# Patient Record
Sex: FEMALE | Race: WHITE | NOT HISPANIC OR LATINO | Employment: FULL TIME | URBAN - METROPOLITAN AREA
[De-identification: names, ages, dates, MRNs, and addresses within clinical notes are randomized per-mention and may not be internally consistent; named-entity substitution may affect disease eponyms.]

---

## 2017-02-17 ENCOUNTER — OFFICE VISIT (OUTPATIENT)
Dept: URGENT CARE | Facility: PHYSICIAN GROUP | Age: 52
End: 2017-02-17
Payer: COMMERCIAL

## 2017-02-17 VITALS
HEART RATE: 65 BPM | OXYGEN SATURATION: 96 % | BODY MASS INDEX: 40.25 KG/M2 | SYSTOLIC BLOOD PRESSURE: 122 MMHG | WEIGHT: 234.6 LBS | RESPIRATION RATE: 14 BRPM | DIASTOLIC BLOOD PRESSURE: 68 MMHG | TEMPERATURE: 97.3 F

## 2017-02-17 DIAGNOSIS — R06.2 WHEEZING: ICD-10-CM

## 2017-02-17 DIAGNOSIS — J06.9 UPPER RESPIRATORY TRACT INFECTION, UNSPECIFIED TYPE: ICD-10-CM

## 2017-02-17 PROCEDURE — 99214 OFFICE O/P EST MOD 30 MIN: CPT | Performed by: PHYSICIAN ASSISTANT

## 2017-02-17 RX ORDER — ALBUTEROL SULFATE 90 UG/1
2 AEROSOL, METERED RESPIRATORY (INHALATION) EVERY 6 HOURS PRN
Qty: 8.5 G | Refills: 0 | Status: SHIPPED | OUTPATIENT
Start: 2017-02-17 | End: 2019-02-20

## 2017-02-17 RX ORDER — CODEINE PHOSPHATE AND GUAIFENESIN 10; 100 MG/5ML; MG/5ML
5 SOLUTION ORAL EVERY 4 HOURS PRN
Qty: 180 ML | Refills: 0 | Status: SHIPPED | OUTPATIENT
Start: 2017-02-17 | End: 2017-02-23

## 2017-02-17 RX ORDER — BENZONATATE 100 MG/1
200 CAPSULE ORAL 3 TIMES DAILY PRN
Qty: 30 CAP | Refills: 0 | Status: SHIPPED | OUTPATIENT
Start: 2017-02-17 | End: 2019-02-20

## 2017-02-17 ASSESSMENT — ENCOUNTER SYMPTOMS
MYALGIAS: 0
HEADACHES: 0
RHINORRHEA: 1
SHORTNESS OF BREATH: 0
WHEEZING: 1
ABDOMINAL PAIN: 0
COUGH: 1
CHILLS: 0
FEVER: 0
SORE THROAT: 1
NECK PAIN: 0
DIARRHEA: 0
SPUTUM PRODUCTION: 0
EYE REDNESS: 0
EYE DISCHARGE: 0

## 2017-02-17 NOTE — PROGRESS NOTES
Subjective:      Zoya Urban is a 52 y.o. female who presents with Cough          Pt is 51 y/o female who presents with congestion and cough for 4-5 days ago. Pt. Notes that she probably got sick from her brother in law. She denies any SOB, CP, or leg swelling.   Cough  This is a new problem. Episode onset: 4 days ago. The problem has been gradually worsening. The problem occurs every few minutes. The cough is non-productive. Associated symptoms include ear congestion, nasal congestion, postnasal drip, rhinorrhea, a sore throat and wheezing. Pertinent negatives include no chest pain, chills, ear pain, eye redness, fever, headaches, myalgias, rash or shortness of breath. Nothing aggravates the symptoms. She has tried nothing for the symptoms. Her past medical history is significant for bronchitis.       Review of Systems   Constitutional: Negative for fever, chills and malaise/fatigue.   HENT: Positive for congestion, postnasal drip, rhinorrhea and sore throat. Negative for ear discharge and ear pain.    Eyes: Negative for discharge and redness.   Respiratory: Positive for cough and wheezing. Negative for sputum production and shortness of breath.    Cardiovascular: Negative for chest pain and leg swelling.   Gastrointestinal: Negative for abdominal pain and diarrhea.   Genitourinary: Negative for dysuria and urgency.   Musculoskeletal: Negative for myalgias and neck pain.   Skin: Negative for itching and rash.   Neurological: Negative for headaches.          Objective:     /68 mmHg  Pulse 65  Temp(Src) 36.3 °C (97.3 °F)  Resp 14  Wt 106.414 kg (234 lb 9.6 oz)  SpO2 96%   PMH:  has a past medical history of Allergy and Heart murmur. She also has no past medical history of Breast cancer or Cancer.  MEDS:   Current outpatient prescriptions:   •  albuterol 108 (90 BASE) MCG/ACT Aero Soln inhalation aerosol, Inhale 2 Puffs by mouth every 6 hours as needed for Shortness of Breath., Disp: 8.5 g, Rfl: 0  •   benzonatate (TESSALON) 100 MG Cap, Take 2 Caps by mouth 3 times a day as needed for Cough., Disp: 30 Cap, Rfl: 0  •  guaifenesin-codeine (ROBITUSSIN AC) Solution oral solution, Take 5 mL by mouth every four hours as needed for Cough (May cause sedation) for up to 6 days., Disp: 180 mL, Rfl: 0  ALLERGIES:   Allergies   Allergen Reactions   • Cipro Xr      SURGHX:   Past Surgical History   Procedure Laterality Date   • Abdominal exploration       SOCHX:  reports that she has never smoked. She does not have any smokeless tobacco history on file. She reports that she drinks about 1.0 oz of alcohol per week. She reports that she does not use illicit drugs.  FH: Family history was reviewed, no pertinent findings to report    Physical Exam   Constitutional: She is oriented to person, place, and time. She appears well-developed and well-nourished.   HENT:   Head: Normocephalic and atraumatic.   Right Ear: External ear normal.   Left Ear: External ear normal.   Mouth/Throat: Oropharynx is clear and moist. No oropharyngeal exudate.   Boggy nasal turbinates       Eyes: Conjunctivae and EOM are normal. Pupils are equal, round, and reactive to light.   Neck: Normal range of motion. Neck supple.   Cardiovascular: Normal rate and regular rhythm.    Murmur heard.  Pulmonary/Chest: Effort normal and breath sounds normal. No respiratory distress.   Musculoskeletal: Normal range of motion. She exhibits no tenderness.   Lymphadenopathy:     She has no cervical adenopathy.   Neurological: She is alert and oriented to person, place, and time.   Skin: Skin is warm. No rash noted.   Psychiatric: She has a normal mood and affect. Her behavior is normal.   Vitals reviewed.              Assessment/Plan:     1. Upper respiratory tract infection, unspecified type  - albuterol 108 (90 BASE) MCG/ACT Aero Soln inhalation aerosol; Inhale 2 Puffs by mouth every 6 hours as needed for Shortness of Breath.  Dispense: 8.5 g; Refill: 0  - benzonatate  (TESSALON) 100 MG Cap; Take 2 Caps by mouth 3 times a day as needed for Cough.  Dispense: 30 Cap; Refill: 0  - guaifenesin-codeine (ROBITUSSIN AC) Solution oral solution; Take 5 mL by mouth every four hours as needed for Cough (May cause sedation) for up to 6 days.  Dispense: 180 mL; Refill: 0    [place]  was reviewed by myself-  Document  does not reveal any concerning patterns. Pt. was advised to avoid the operation of heavy machine along with driving while on such medications. Finally pt. was advised to use medication only as prescribed.       2. Wheezing- without wheezing on exam- she is to fill albuterol if she develops chest tightness or wheezing the future.     - albuterol 108 (90 BASE) MCG/ACT Aero Soln inhalation aerosol; Inhale 2 Puffs by mouth every 6 hours as needed for Shortness of Breath.  Dispense: 8.5 g; Refill: 0    Discussed viral nature of symptoms today. Increase fluids. Avoid night time dairy  Patient given precautionary s/sx that mandate immediate follow up and evaluation in the ED. Advised of risks of not doing so.    DDX, Supportive care, and indications for immediate follow-up discussed with patient.    Instructed to return to clinic or nearest emergency department if we are not available for any change in condition, further concerns, or worsening of symptoms.    The patient demonstrated a good understanding and agreed with the treatment plan.

## 2017-02-17 NOTE — MR AVS SNAPSHOT
Zoya Urban   2017 11:15 AM   Office Visit   MRN: [id number]    Department:  [facility]   Dept Phone:  [phone]    Description:  Female : 1965   Provider:  Brady Mckeon PA-C           Reason for Visit     Cough x 4 days       Allergies as of 2017     Allergen Noted Reactions    Cipro Xr 2010         You were diagnosed with     Upper respiratory tract infection, unspecified type   [8797004]       Wheezing   [786.07.ICD-9-CM]         Vital Signs     Blood Pressure Pulse Temperature Respirations Weight Oxygen Saturation    122/68 mmHg 65 36.3 °C (97.3 °F) 14 106.414 kg (234 lb 9.6 oz) 96%      Basic Information     Date Of Birth Sex Race Ethnicity Preferred Language    1965 Female White Non- English      Health Maintenance        Date Due Completion Dates    IMM DTaP/Tdap/Td Vaccine (1 - Tdap) 2/3/1982 ---    PAP SMEAR 2/3/1984 ---    COLONOSCOPY 2/3/2013 ---    IMM INFLUENZA (1) 2016 ---    MAMMOGRAM 3/24/2017 3/24/2016, 2014, 2013, 2011, 2010, 2010, 2009, 2009, 2008, 2008, 2007, 2007, 10/12/2006, 10/12/2006, 3/30/2006, 3/16/2006, 3/7/2005, 3/4/2004            Current Immunizations     No immunizations on file.      Below and/or attached are the medications your provider expects you to take. Review all of your home medications and newly ordered medications with your provider and/or pharmacist. Follow medication instructions as directed by your provider and/or pharmacist. Please keep your medication list with you and share with your provider. Update the information when medications are discontinued, doses are changed, or new medications (including over-the-counter products) are added; and carry medication information at all times in the event of emergency situations     Allergies:  CIPRO XR - (reactions not documented)               Medications  Valid as of: 2017 - 11:47 AM    Generic Name  Brand Name Tablet Size Instructions for use    Albuterol Sulfate (Aero Soln) albuterol 108 (90 BASE) MCG/ACT Inhale 2 Puffs by mouth every 6 hours as needed for Shortness of Breath.        Benzonatate (Cap) TESSALON 100 MG Take 2 Caps by mouth 3 times a day as needed for Cough.        Guaifenesin-Codeine (Solution) ROBITUSSIN -10 mg/5mL Take 5 mL by mouth every four hours as needed for Cough (May cause sedation) for up to 6 days.        .                 Medicines prescribed today were sent to:     [id number] - [place], [place] - [address]    [address] [place] [address]    Phone: [phone] Fax: [phone]    Open 24 Hours?: No    [id number] - [place], [place] - [address] [place] [address]    Phone: [phone] Fax: [phone]    Open 24 Hours?: No      Medication refill instructions:       If your prescription bottle indicates you have medication refills left, it is not necessary to call your provider’s office. Please contact your pharmacy and they will refill your medication.    If your prescription bottle indicates you do not have any refills left, you may request refills at any time through one of the following ways: The online [employer] system (except Urgent Care), by calling your provider’s office, or by asking your pharmacy to contact your provider’s office with a refill request. Medication refills are processed only during regular business hours and may not be available until the next business day. Your provider may request additional information or to have a follow-up visit with you prior to refilling your medication.   *Please Note: Medication refills are assigned a new Rx number when refilled electronically. Your pharmacy may indicate that no refills were authorized even though a new prescription for the same medication is available at the pharmacy. Please request the medicine by name with the pharmacy before  contacting your provider for a refill.           Mobile-XL Access Code: [email or web address]  Expires: 3/19/2017 11:47 AM    Mobile-XL  A secure, online tool to manage your health information     [employer] Mobile-XL® is a secure, online tool that connects you to your personalized health information from the privacy of your home -- day or night - making it very easy for you to manage your healthcare. Once the activation process is completed, you can even access your medical information using the Mobile-XL ricky, which is available for free in the Apple Ricky store or Google Play store.     Mobile-XL provides the following levels of access (as shown below):   My Chart Features   [facility] Primary Care Doctor [facility]  Specialists [facility]  Urgent  Care Non-[facility]  Primary Care  Doctor   Email your healthcare team securely and privately 24/7 X X X    Manage appointments: schedule your next appointment; view details of past/upcoming appointments X      Request prescription refills. X      View recent personal medical records, including lab and immunizations X X X X   View health record, including health history, allergies, medications X X X X   Read reports about your outpatient visits, procedures, consult and ER notes X X X X   See your discharge summary, which is a recap of your hospital and/or ER visit that includes your diagnosis, lab results, and care plan. X X       How to register for Mobile-XL:  1. Go to  [email or web address].  2. Click on the Sign Up Now box, which takes you to the New Member Sign Up page. You will need to provide the following information:  a. Enter your Mobile-XL Access Code exactly as it appears at the top of this page. (You will not need to use this code after you’ve completed the sign-up process. If you do not sign up before the expiration date, you must request a new code.)   b. Enter your date of birth.   c. Enter your home email address.   d. Click Submit, and follow the next screen’s  instructions.  3. Create a Livekickt ID. This will be your Livekickt login ID and cannot be changed, so think of one that is secure and easy to remember.  4. Create a Livekickt password. You can change your password at any time.  5. Enter your Password Reset Question and Answer. This can be used at a later time if you forget your password.   6. Enter your e-mail address. This allows you to receive e-mail notifications when new information is available in [employer].  7. Click Sign Up. You can now view your health information.    For assistance activating your [employer] account, call [phone]

## 2017-08-14 ENCOUNTER — HOSPITAL ENCOUNTER (OUTPATIENT)
Dept: RADIOLOGY | Facility: MEDICAL CENTER | Age: 52
End: 2017-08-14
Attending: OBSTETRICS & GYNECOLOGY
Payer: COMMERCIAL

## 2017-08-14 DIAGNOSIS — Z12.31 VISIT FOR SCREENING MAMMOGRAM: ICD-10-CM

## 2017-08-14 PROCEDURE — 77063 BREAST TOMOSYNTHESIS BI: CPT

## 2017-08-19 ENCOUNTER — HOSPITAL ENCOUNTER (OUTPATIENT)
Dept: LAB | Facility: MEDICAL CENTER | Age: 52
End: 2017-08-19
Attending: OBSTETRICS & GYNECOLOGY
Payer: COMMERCIAL

## 2017-08-19 LAB
ALBUMIN SERPL BCP-MCNC: 3.4 G/DL (ref 3.2–4.9)
ALBUMIN/GLOB SERPL: 1 G/DL
ALP SERPL-CCNC: 73 U/L (ref 30–99)
ALT SERPL-CCNC: 18 U/L (ref 2–50)
ANION GAP SERPL CALC-SCNC: 6 MMOL/L (ref 0–11.9)
APTT PPP: 27.8 SEC (ref 24.7–36)
AST SERPL-CCNC: 20 U/L (ref 12–45)
BASOPHILS # BLD AUTO: 1.3 % (ref 0–1.8)
BASOPHILS # BLD: 0.07 K/UL (ref 0–0.12)
BILIRUB SERPL-MCNC: 0.4 MG/DL (ref 0.1–1.5)
BUN SERPL-MCNC: 18 MG/DL (ref 8–22)
CALCIUM SERPL-MCNC: 9 MG/DL (ref 8.5–10.5)
CHLORIDE SERPL-SCNC: 108 MMOL/L (ref 96–112)
CHOLEST SERPL-MCNC: 189 MG/DL (ref 100–199)
CO2 SERPL-SCNC: 27 MMOL/L (ref 20–33)
CREAT SERPL-MCNC: 0.68 MG/DL (ref 0.5–1.4)
EOSINOPHIL # BLD AUTO: 0.09 K/UL (ref 0–0.51)
EOSINOPHIL NFR BLD: 1.6 % (ref 0–6.9)
ERYTHROCYTE [DISTWIDTH] IN BLOOD BY AUTOMATED COUNT: 50.3 FL (ref 35.9–50)
GFR SERPL CREATININE-BSD FRML MDRD: >60 ML/MIN/1.73 M 2
GLOBULIN SER CALC-MCNC: 3.4 G/DL (ref 1.9–3.5)
GLUCOSE SERPL-MCNC: 79 MG/DL (ref 65–99)
HCT VFR BLD AUTO: 40.4 % (ref 37–47)
HDLC SERPL-MCNC: 49 MG/DL
HGB BLD-MCNC: 12.3 G/DL (ref 12–16)
IMM GRANULOCYTES # BLD AUTO: 0.01 K/UL (ref 0–0.11)
IMM GRANULOCYTES NFR BLD AUTO: 0.2 % (ref 0–0.9)
INR PPP: 0.94 (ref 0.87–1.13)
LDLC SERPL CALC-MCNC: 123 MG/DL
LYMPHOCYTES # BLD AUTO: 1.82 K/UL (ref 1–4.8)
LYMPHOCYTES NFR BLD: 32.7 % (ref 22–41)
MCH RBC QN AUTO: 27.4 PG (ref 27–33)
MCHC RBC AUTO-ENTMCNC: 30.4 G/DL (ref 33.6–35)
MCV RBC AUTO: 90 FL (ref 81.4–97.8)
MONOCYTES # BLD AUTO: 0.51 K/UL (ref 0–0.85)
MONOCYTES NFR BLD AUTO: 9.2 % (ref 0–13.4)
NEUTROPHILS # BLD AUTO: 3.07 K/UL (ref 2–7.15)
NEUTROPHILS NFR BLD: 55 % (ref 44–72)
NRBC # BLD AUTO: 0 K/UL
NRBC BLD AUTO-RTO: 0 /100 WBC
PLATELET # BLD AUTO: 357 K/UL (ref 164–446)
PMV BLD AUTO: 9.9 FL (ref 9–12.9)
POTASSIUM SERPL-SCNC: 4.2 MMOL/L (ref 3.6–5.5)
PROT SERPL-MCNC: 6.8 G/DL (ref 6–8.2)
PROTHROMBIN TIME: 12.9 SEC (ref 12–14.6)
RBC # BLD AUTO: 4.49 M/UL (ref 4.2–5.4)
SODIUM SERPL-SCNC: 141 MMOL/L (ref 135–145)
T4 SERPL-MCNC: 7.1 UG/DL (ref 4–12)
TRIGL SERPL-MCNC: 84 MG/DL (ref 0–149)
TSH SERPL DL<=0.005 MIU/L-ACNC: 1.96 UIU/ML (ref 0.3–3.7)
WBC # BLD AUTO: 5.6 K/UL (ref 4.8–10.8)

## 2017-08-19 PROCEDURE — 84436 ASSAY OF TOTAL THYROXINE: CPT

## 2017-08-19 PROCEDURE — 85730 THROMBOPLASTIN TIME PARTIAL: CPT

## 2017-08-19 PROCEDURE — 84443 ASSAY THYROID STIM HORMONE: CPT

## 2017-08-19 PROCEDURE — 85610 PROTHROMBIN TIME: CPT

## 2017-08-19 PROCEDURE — 36415 COLL VENOUS BLD VENIPUNCTURE: CPT

## 2017-08-19 PROCEDURE — 80053 COMPREHEN METABOLIC PANEL: CPT

## 2017-08-19 PROCEDURE — 80061 LIPID PANEL: CPT

## 2017-08-19 PROCEDURE — 85025 COMPLETE CBC W/AUTO DIFF WBC: CPT

## 2019-02-20 ENCOUNTER — APPOINTMENT (OUTPATIENT)
Dept: RADIOLOGY | Facility: MEDICAL CENTER | Age: 54
End: 2019-02-20
Attending: OBSTETRICS & GYNECOLOGY
Payer: COMMERCIAL

## 2019-02-20 ENCOUNTER — OFFICE VISIT (OUTPATIENT)
Dept: MEDICAL GROUP | Facility: MEDICAL CENTER | Age: 54
End: 2019-02-20
Payer: COMMERCIAL

## 2019-02-20 VITALS
HEART RATE: 74 BPM | HEIGHT: 64 IN | DIASTOLIC BLOOD PRESSURE: 76 MMHG | BODY MASS INDEX: 42.34 KG/M2 | OXYGEN SATURATION: 97 % | SYSTOLIC BLOOD PRESSURE: 128 MMHG | WEIGHT: 248 LBS | RESPIRATION RATE: 16 BRPM

## 2019-02-20 DIAGNOSIS — M79.622 PAIN OF LEFT UPPER ARM: ICD-10-CM

## 2019-02-20 DIAGNOSIS — Z00.00 ROUTINE GENERAL MEDICAL EXAMINATION AT A HEALTH CARE FACILITY: ICD-10-CM

## 2019-02-20 DIAGNOSIS — R01.1 HEART MURMUR: ICD-10-CM

## 2019-02-20 PROCEDURE — 99214 OFFICE O/P EST MOD 30 MIN: CPT | Performed by: NURSE PRACTITIONER

## 2019-02-20 ASSESSMENT — ENCOUNTER SYMPTOMS: MYALGIAS: 1

## 2019-02-20 ASSESSMENT — PATIENT HEALTH QUESTIONNAIRE - PHQ9: CLINICAL INTERPRETATION OF PHQ2 SCORE: 0

## 2019-02-20 NOTE — PROGRESS NOTES
Subjective:      Zoya Urban is a 54 y.o. female who presents with [facility]        CC: Patient here today to establish with a PCP as well as for left arm pain.  She has been following with her gynecologist about every 2 years but has not seen a regular PCP in over a decade.    HPI Zoya Urban      1. Heart murmur  Patient noted to have heart murmur when being examined today and she states she was diagnosed with this at the age of 4.  She states she also had a heart catheterization at the age of 16 and nothing abnormal was found.  She states she has not had an echocardiogram in at least 10 years.  She reports however that she has no problems with fatigue, shortness of breath or chest pain.    2. Pain of left upper arm  Patient states that for the past few months she has had a pain in her left bicep area.  The pain usually occurs with any movement of the left arm.  She states there is also stiffness and she has difficulty elevating the left arm without pain in the bicep.  The pain does not radiate from the neck and she does not report any pain or numbness of her hands.  She denies any trauma.    3. Routine general medical examination at a [facility] care facility  Patient's last blood work was done in 2017 for her gynecologist.  Review of those results show a normal chemistry panel and CBC.  TSH was also normal but lipid panel showed an LDL of 123 with normal total cholesterol and HDL.    4. BMI 40.0-44.9, adult (HCC)  Patient states she has had problems with weight for many years.  She states she did have a prior gastric bypass.  No current outpatient prescriptions on file.     No current facility-administered medications for this visit.      Social History   Substance Use Topics   • Smoking status: Never Smoker   • Smokeless tobacco: Never Used   • Alcohol use 2.4 oz/week     4 Glasses of wine per week     Family History   Problem Relation Age of Onset   • Cancer Maternal Aunt         breast   • Cancer  "Mother    • Cancer Father         prosate   • Lung Disease Father      Past Medical History:   Diagnosis Date   • Allergy    • Heart murmur        Review of Systems   Musculoskeletal: Positive for myalgias.   All other systems reviewed and are negative.         Objective:     /76 (BP Location: Right arm, Patient Position: Sitting, BP Cuff Size: Adult)   Pulse 74   Resp 16   Ht 1.626 m (5' 4\")   Wt 112.5 kg (248 lb)   SpO2 97%   BMI 42.57 kg/m²      Physical Exam   Constitutional: She is oriented to person, place, and time. She appears well-developed and well-nourished. No distress.   HENT:   Head: Normocephalic and atraumatic.   Right Ear: External ear normal.   Left Ear: External ear normal.   Nose: Nose normal.   Eyes: Right eye exhibits no discharge. Left eye exhibits no discharge.   Neck: Normal range of motion. Neck supple. No thyromegaly present.   Cardiovascular: Normal rate and regular rhythm.  Exam reveals no gallop and no friction rub.    Murmur heard.  Pulmonary/Chest: Effort normal and breath sounds normal. She has no wheezes. She has no rales.   Musculoskeletal: She exhibits no edema or tenderness.   Decreased mobility of the left arm with elevation only to 45 degrees and pain in the left bicep area.   Neurological: She is alert and oriented to person, place, and time. She displays normal reflexes.   Skin: Skin is warm and dry. No rash noted. She is not diaphoretic.   Psychiatric: She has a normal mood and affect. Her behavior is normal. Judgment and thought content normal.   Nursing note and vitals reviewed.              Assessment/Plan:     1. Heart murmur  Patient states she has had this murmur since she was child and is asymptomatic but has not had an echocardiogram in at least 10 years and we do not have those prior results.  I told her I would like to do a routine echo to make sure she is not showing moderate to severe valvular disease for which she would need to be referred to " cardiology.  - EC-ECHOCARDIOGRAM COMPLETE W/O CONT; Future    2. Pain of left upper arm  I will have patient first start with physical therapy on the left arm to increase mobility and decrease pain.  If there is no response I will send her to orthopedics.  - REFERRAL TO PHYSICAL THERAPY Reason for Therapy: Eval/Treat/Report    3. Routine general medical examination at a health care facility  I advised patient to do yearly blood work.  I explained based on her previous results, her LDL was elevated and we should do a 10-year ASCVD risk assessment after her blood work is completed.  - Comp Metabolic Panel; Future  - Lipid Profile; Future  - TSH; Future    4. BMI 40.0-44.9, adult (HCC)  Patient has had previous gastric bypass and does not wish to go any further with counseling or bariatrics.  - Patient identified as having weight management issue.  Appropriate orders and counseling given.

## 2019-03-06 ENCOUNTER — HOSPITAL ENCOUNTER (OUTPATIENT)
Dept: CARDIOLOGY | Facility: MEDICAL CENTER | Age: 54
End: 2019-03-06
Attending: NURSE PRACTITIONER
Payer: COMMERCIAL

## 2019-03-06 DIAGNOSIS — Q21.0 VENTRICULAR SEPTAL DEFECT: ICD-10-CM

## 2019-03-06 DIAGNOSIS — R01.1 HEART MURMUR: ICD-10-CM

## 2019-03-06 LAB
LV EJECT FRACT  99904: 65
LV EJECT FRACT MOD 2C 99903: 54.7
LV EJECT FRACT MOD 4C 99902: 64.92
LV EJECT FRACT MOD BP 99901: 60

## 2019-03-06 PROCEDURE — 93306 TTE W/DOPPLER COMPLETE: CPT

## 2019-03-06 PROCEDURE — 93306 TTE W/DOPPLER COMPLETE: CPT | Mod: 26 | Performed by: INTERNAL MEDICINE

## 2019-03-11 ENCOUNTER — APPOINTMENT (OUTPATIENT)
Dept: PHYSICAL THERAPY | Facility: REHABILITATION | Age: 54
End: 2019-03-11
Attending: NURSE PRACTITIONER
Payer: COMMERCIAL

## 2019-03-11 DIAGNOSIS — M25.512 CHRONIC LEFT SHOULDER PAIN: ICD-10-CM

## 2019-03-11 DIAGNOSIS — G89.29 CHRONIC LEFT SHOULDER PAIN: ICD-10-CM

## 2019-03-11 PROCEDURE — 97161 PT EVAL LOW COMPLEX 20 MIN: CPT

## 2019-03-11 PROCEDURE — 97014 ELECTRIC STIMULATION THERAPY: CPT

## 2019-03-11 ASSESSMENT — ENCOUNTER SYMPTOMS
QUALITY: DULL ACHE
PAIN SCALE AT LOWEST: 3
ALLEVIATING FACTORS: NOTHING
EXACERBATED BY: LIFTING
PAIN SCALE: 7
QUALITY: ACHING
EXACERBATED BY: ACTIVITY
PAIN SCALE AT HIGHEST: 9

## 2019-03-11 NOTE — OP THERAPY EVALUATION
Outpatient Physical Therapy  INITIAL EVALUATION    [facility] [address]  [address]  [place] [address]  Phone:  [phone]  Fax:  [phone]    Date of Evaluation: 2019    Patient: Zoya Urban  YOB: 1965  MRN: [id number]     Referring Provider: DENNY Collins  [address]  [place] [address]   Referring Diagnosis Pain of left upper arm [M79.622]     Time Calculation  Start time: 1615  Stop time: 1700 Time Calculation (min): 45 minutes     Physical Therapy Occurrence Codes    Date of onset of impairment:  18   Date physical therapy care plan established or reviewed:  3/11/19   Date physical therapy treatment started:  3/11/19          Chief Complaint: Shoulder Problem    Visit Diagnoses     ICD-10-CM   1. Chronic left shoulder pain M25.512    G89.29         Subjective:   History of Present Illness:     Date of onset:  2018    Mechanism of injury:  Patient reported increased pain with lifting arm, washing hair and getting dressed. She reports right upper arm pain that has progressively worsened. She is also reporting tingling along medial border of her shoulder pain.  She has increased pain with reclining on the couch.   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    Patient Goals:     Patient goals for therapy:  Decreased pain and increased motion      Past Medical History:   Diagnosis  Date   • Allergy    • Heart murmur      Past Surgical History:   Procedure Laterality Date   • ABDOMINAL EXPLORATION     • GASTRIC BYPASS LAPAROSCOPIC       Social History   Substance Use Topics   • Smoking status: Never Smoker   • Smokeless tobacco: Never Used   • Alcohol use 2.4 oz/week     4 Glasses of wine per week     Family and Occupational History     Social History   • Marital status:      Spouse name: N/A   • Number of children: N/A   • Years of education: N/A       Objective     Postural Observations  Seated posture: fair  Standing posture: fair    Additional Postural Observation Details  Forward head, thoracic kyphosis    Cervical Screen    Cervical range of motion within normal limits    Neurological Testing     Sensation     Shoulder   Left Shoulder   Intact: light touch    Right Shoulder   Intact: light touch    Reflexes   Left   Biceps (C5/C6): normal (2+)  Brachioradialis (C6): normal (2+)  Triceps (C7): normal (2+)    Right   Biceps (C5/C6): normal (2+)  Brachioradialis (C6): normal (2+)  Triceps (C7): normal (2+)    Active Range of Motion   Left Shoulder   Flexion: 90 degrees with pain  Abduction: 85 degrees with pain  Internal rotation BTB: L5 with pain    Right Shoulder   Normal active range of motion    Additional Active Range of Motion Details  RMIN strong and painless except internal rotation     Passive Range of Motion   Left Shoulder   Flexion: 105 degrees with pain  Abduction: 105 degrees with pain    Additional Passive Range of Motion Details  Patient limited with PROM testing by pain, but endfeel appears empty.    Joint Play   Left Shoulder     Anterior capsule: within functional limits    Posterior capsule: within functional limits    Inferior capsule: within functional limits    Tests     Left Shoulder   Negative full can and Spurling's sign.     Additional Tests Details  Any movement greater than 90 degrees is painful actively         Therapeutic Exercises (CPT 51910):     1.  [place]     2. Postural     4. R/O shoulder vs. cervical     Therapeutic Treatments and Modalities:     1. E Stim Unattended (CPT 71178), IFC and heat to left shoulder x 15 min    Time-based treatments/modalities:          Assessment, Response and Plan:   Impairments: abnormal ADL function, abnormal or restricted ROM, activity intolerance, impaired physical strength, lacks appropriate home exercise program and pain with function    Assessment details:  Patient is a 54 year old female with a [facility] signficant for heart murmur who complains of 4 month history of left shoulder pain that has progressively worsened and is now impeding in patients ability to dress, bathe and groom self. Patient presents with symptoms consistent with possible partial RTC tear and would benefit from skilled PT with a focus on the deficits above to improve patients quality of life.   Barriers to therapy:  None  Prognosis: good    Goals:   Short Term Goals:   1.  Patient will be independent in HEP with written instructions.   Short term goal time span:  2-4 weeks      Long Term Goals:    1. Patient will report being able to wash her hair with both upper extremities without being limited by pain.   2. Patient will report being able to sit in her recliner without being limited by pain.   3.  Patient will score <15% impairment on the Quick Dash.   4. Patient will be independent in pain management techniques and HEP as directed.   Long term goal time span:  6-8 weeks    Plan:   Therapy options:  Physical therapy treatment to continue  Planned therapy interventions:  Neuromuscular Re-education (CPT 28821), Manual Therapy (CPT 36602), E Stim Unattended (CPT 46377), Therapeutic Exercise (CPT 15195) and Mechanical Traction (CPT 47408)  Frequency:  2x week  Duration in weeks:  8  Discussed with:  Patient  Plan details:  UPOC 5/6/19      Functional Limitations and Severity Modifiers      Current:     Goal:       Referring provider co-signature:  I have  reviewed this plan of care and my co-signature certifies the need for services.  Certification Dates:   From 3/11/18    To 5/6/19    Physician Signature: ________________________________ Date: ______________

## 2019-03-14 ENCOUNTER — APPOINTMENT (OUTPATIENT)
Dept: PHYSICAL THERAPY | Facility: REHABILITATION | Age: 54
End: 2019-03-14
Attending: NURSE PRACTITIONER
Payer: COMMERCIAL

## 2019-03-18 ENCOUNTER — HOSPITAL ENCOUNTER (OUTPATIENT)
Dept: RADIOLOGY | Facility: MEDICAL CENTER | Age: 54
End: 2019-03-18
Attending: OBSTETRICS & GYNECOLOGY
Payer: COMMERCIAL

## 2019-03-18 DIAGNOSIS — Z12.39 SCREENING BREAST EXAMINATION: ICD-10-CM

## 2019-03-18 PROCEDURE — 77063 BREAST TOMOSYNTHESIS BI: CPT

## 2019-03-19 ENCOUNTER — APPOINTMENT (OUTPATIENT)
Dept: PHYSICAL THERAPY | Facility: REHABILITATION | Age: 54
End: 2019-03-19
Attending: NURSE PRACTITIONER
Payer: COMMERCIAL

## 2019-03-21 ENCOUNTER — APPOINTMENT (OUTPATIENT)
Dept: PHYSICAL THERAPY | Facility: REHABILITATION | Age: 54
End: 2019-03-21
Attending: NURSE PRACTITIONER
Payer: COMMERCIAL

## 2019-03-27 ENCOUNTER — PHYSICAL THERAPY (OUTPATIENT)
Dept: PHYSICAL THERAPY | Facility: REHABILITATION | Age: 54
End: 2019-03-27
Attending: NURSE PRACTITIONER
Payer: COMMERCIAL

## 2019-03-27 DIAGNOSIS — G89.29 CHRONIC LEFT SHOULDER PAIN: ICD-10-CM

## 2019-03-27 DIAGNOSIS — M25.512 CHRONIC LEFT SHOULDER PAIN: ICD-10-CM

## 2019-03-27 PROCEDURE — 97014 ELECTRIC STIMULATION THERAPY: CPT

## 2019-03-27 PROCEDURE — 97110 THERAPEUTIC EXERCISES: CPT

## 2019-03-27 PROCEDURE — 97140 MANUAL THERAPY 1/> REGIONS: CPT

## 2019-03-27 NOTE — OP THERAPY DAILY TREATMENT
Outpatient Physical Therapy  DAILY TREATMENT     [facility] Physical [address].  [address]  [place] [address]  Phone:  [phone]  Fax:  [phone]    Date: 03/27/2019    Patient: Zoya Urban  YOB: 1965  MRN: [id number]     Time Calculation  Start time: 0355  Stop time: 0445 Time Calculation (min): 50 minutes     Chief Complaint: Shoulder Problem    Visit #: 2    SUBJECTIVE:  It is about the same.     OBJECTIVE:  Current objective measures:     GH flexion 105 with pain       Therapeutic Exercises (CPT 76409):     1. UBE x 5 minutes     2. Rows , d/c due to pain    3. Lat pulls , level 0 band     4. Pulleys x 5 min    5. Supine GH cane flexion     6. Supine ER with cane     Therapeutic Treatments and Modalities:     1. Manual Therapy (CPT 25472), GH mobs, scap mobs     2. E Stim Unattended (CPT 90533), IFC and heat x 15 min to left shoulder     Time-based treatments/modalities:  Manual therapy minutes (CPT 51938): 15 minutes  Therapeutic exercise minutes (CPT 52759): 15 minutes         ASSESSMENT:   Response to treatment: Patient demonstrated improved PROM after treatment.  PROM 125 degrees after pulleys.     PLAN/RECOMMENDATIONS:   Plan for treatment: therapy treatment to continue next visit.  Planned interventions for next visit: continue with current treatment.

## 2019-04-01 ENCOUNTER — APPOINTMENT (OUTPATIENT)
Dept: PHYSICAL THERAPY | Facility: REHABILITATION | Age: 54
End: 2019-04-01
Attending: NURSE PRACTITIONER
Payer: COMMERCIAL

## 2019-04-03 ENCOUNTER — PHYSICAL THERAPY (OUTPATIENT)
Dept: PHYSICAL THERAPY | Facility: REHABILITATION | Age: 54
End: 2019-04-03
Attending: NURSE PRACTITIONER
Payer: COMMERCIAL

## 2019-04-03 DIAGNOSIS — G89.29 CHRONIC LEFT SHOULDER PAIN: ICD-10-CM

## 2019-04-03 DIAGNOSIS — M25.512 CHRONIC LEFT SHOULDER PAIN: ICD-10-CM

## 2019-04-03 PROCEDURE — 97110 THERAPEUTIC EXERCISES: CPT

## 2019-04-03 PROCEDURE — 97014 ELECTRIC STIMULATION THERAPY: CPT

## 2019-04-03 NOTE — OP THERAPY DAILY TREATMENT
Outpatient Physical Therapy  DAILY TREATMENT     [address]  [address]  [place] [address]  Phone:  [phone]  Fax:  [phone]    Date: 04/03/2019    Patient: Zoya Urban  YOB: 1965  MRN: [id number]     Time Calculation  Start time: 1530  Stop time: 1615 Time Calculation (min): 45 minutes     Chief Complaint: L shoulder pain   Visit #: 3    SUBJECTIVE:  Patient reports that she has been having a hard week    OBJECTIVE:  Current objective measures:   L shoulder flexion: AROM pain starts at 75 degrees, 95 degrees with pain // pain starts at 85 degrees, AROM to 95 degrees           Therapeutic Exercises (CPT 40989):     1. UBE x 4 minutes , level 2, alternating fwd/bwd     2. Pulleys x 5 min    3. 1/2 foam roller , pec stretch, requires rolled pillow under L UE     4. 1/2 foam roller , dowel flexion, increased sxs on 1/2 roller vs no roller     5. Closed chain shoulder flexion assist wall slide , x 10     6. Resisted wall walk , white band x 15 , improved ROM on wall     Therapeutic Treatments and Modalities:     2. E Stim Unattended (CPT 81689), IFC and heat x 15 min to left shoulder     Time-based treatments/modalities:  Therapeutic exercise minutes (CPT 52430): 30 minutes       ASSESSMENT:   Response to treatment: Patient tolerated increased closed chain exercises and ROM today. Discussed postural awareness at work to decreased GH impingement.     PLAN/RECOMMENDATIONS:   Plan for treatment: therapy treatment to continue next visit. Pulleys, closed chain ROM, isometric strengthening.   Planned interventions for next visit: continue with current treatment.

## 2019-04-10 ENCOUNTER — PHYSICAL THERAPY (OUTPATIENT)
Dept: PHYSICAL THERAPY | Facility: REHABILITATION | Age: 54
End: 2019-04-10
Attending: NURSE PRACTITIONER
Payer: COMMERCIAL

## 2019-04-10 DIAGNOSIS — M25.512 CHRONIC LEFT SHOULDER PAIN: ICD-10-CM

## 2019-04-10 DIAGNOSIS — G89.29 CHRONIC LEFT SHOULDER PAIN: ICD-10-CM

## 2019-04-10 PROCEDURE — 97110 THERAPEUTIC EXERCISES: CPT

## 2019-04-10 PROCEDURE — 97014 ELECTRIC STIMULATION THERAPY: CPT

## 2019-04-10 NOTE — OP THERAPY DAILY TREATMENT
Outpatient Physical Therapy  DAILY TREATMENT     [address]  [address]  [place] [address]  Phone:  [phone]  Fax:  [phone]    Date: 04/10/2019    Patient: Zoya Urban  YOB: 1965  MRN: [id number]     Time Calculation  Start time: 1630  Stop time: 1715 Time Calculation (min): 45 minutes     Chief Complaint: L shoulder pain    Visit #: 4    SUBJECTIVE:  Patient reports she continues to have pain reaching forward      OBJECTIVE:  Current objective measures:   L shoulder flexion: AROM: 104 degrees //  105 degrees   L shoulder abduction: 90 degrees// 100 degrees           Therapeutic Exercises (CPT 20583):     1. UBE x 4 minutes , level 2, alternating fwd/bwd     2. 1/2 foam roller , pec stretch, requires rolled pillow under L UE , trialed with no pillow continues to cause sxs    3. 1/2 foam roller , dowel flexion x 10, more tension in T-spine vs L UE position     4. Standing , shoulder extension, IR with dowel x 10     5. Isometric shoulder flexion, x 10 with 5 second hold , HEP     6. Isometric IR , x 10 with 5 second hold , HEP    7. Isometric ER , x 10 with 5 second hold , HEP    8. Isometric shoulder extension , x 10 with 5 second hold , HEP    Therapeutic Treatments and Modalities:     2. E Stim Unattended (CPT 83174), IFC and heat x 15 min to left shoulder     UPOC 05/06/2019    Time-based treatments/modalities:  Therapeutic exercise minutes (CPT 66392): 35 minutes       ASSESSMENT:   Response to treatment: Patient ROM improved from previous session. Patient does demonstrate increased thoracic rotation to decrease use of RTC muscles with exercises and requires cueing. Patient reports she needs to change PT session to 1-2x a month due to financial cost.     PLAN/RECOMMENDATIONS:   Plan for treatment: therapy treatment to continue next visit.  Planned interventions for next visit: continue with current treatment.

## 2019-04-11 ENCOUNTER — APPOINTMENT (OUTPATIENT)
Dept: PHYSICAL THERAPY | Facility: REHABILITATION | Age: 54
End: 2019-04-11
Attending: NURSE PRACTITIONER
Payer: COMMERCIAL

## 2019-04-12 NOTE — OP THERAPY PROGRESS SUMMARY
Outpatient Physical Therapy  PROGRESS SUMMARY NOTE      [facility] Physical Therapy [address]  [address]  [place] [address]  Phone:  [phone]  Fax:  [phone]    Date of Visit: 04/10/2019    Patient: Zoya Urban  YOB: 1965  MRN: [id number]     Referring Provider: DENNY Collins [facility] [address]  [place] [address]   Referring Diagnosis Pain in left upper arm [M79.622]     Visit Diagnoses     ICD-10-CM   1. Chronic left shoulder pain M25.512    G89.29       Rehab Potential: fair    Physical Therapy Occurrence Codes    Date of onset of impairment:  11/30/18   Date physical therapy care plan established or reviewed:  3/11/19   Date physical therapy treatment started:  3/11/19          Cert Period:From 3/11/19    To 5/6/19  Progress Report Period: 03/11/2018 to 04/10/2019     Functional Limitations and Severity Modifiers          Progress Summary Details  L shoulder flexion: AROM: 104 degrees //  105 degrees   L shoulder abduction: 90 degrees// 100 degrees     Patient ROM improved from previous session. Patient does demonstrate increased thoracic rotation to decrease use of RTC muscles with exercises and requires cueing. Patient reports she needs to change PT session to 1-2x a month due to financial cost.     Requesting to move certification date thru 07/06/2019 to accommodate patient financial situation   Frequency: 1-2x a month      Referring provider co-signature:  I have reviewed this plan of care and my co-signature certifies the need for services.    Physician Signature: ________________________________ Date: ______________

## 2019-05-01 ENCOUNTER — APPOINTMENT (OUTPATIENT)
Dept: PHYSICAL THERAPY | Facility: REHABILITATION | Age: 54
End: 2019-05-01
Attending: NURSE PRACTITIONER
Payer: COMMERCIAL

## 2019-05-07 ENCOUNTER — TELEPHONE (OUTPATIENT)
Dept: CARDIOLOGY | Facility: MEDICAL CENTER | Age: 54
End: 2019-05-07

## 2019-05-08 ENCOUNTER — PHYSICAL THERAPY (OUTPATIENT)
Dept: PHYSICAL THERAPY | Facility: REHABILITATION | Age: 54
End: 2019-05-08
Attending: NURSE PRACTITIONER
Payer: COMMERCIAL

## 2019-05-08 DIAGNOSIS — G89.29 CHRONIC LEFT SHOULDER PAIN: ICD-10-CM

## 2019-05-08 DIAGNOSIS — M25.512 CHRONIC LEFT SHOULDER PAIN: ICD-10-CM

## 2019-05-08 PROCEDURE — 97014 ELECTRIC STIMULATION THERAPY: CPT

## 2019-05-08 PROCEDURE — 97140 MANUAL THERAPY 1/> REGIONS: CPT

## 2019-05-08 PROCEDURE — 97110 THERAPEUTIC EXERCISES: CPT

## 2019-05-08 NOTE — OP THERAPY DAILY TREATMENT
Outpatient Physical Therapy  DAILY TREATMENT     [facility] Physical [address]  [address]  [place] [address]  Phone:  [phone]  Fax:  [phone]    Date: 05/08/2019    Patient: Zoya Urban  YOB: 1965  MRN: [id number]     Time Calculation    1630  1715 45 minutes      Chief Complaint:   Visit #: 5    SUBJECTIVE:  Patient reports pain continues be limiting with reaching forward.    OBJECTIVE:  Current objective measures:   Flexion: 110 degrees with pain  Abduction: 90 degrees with pain  Internal rotation BTB: L pelvis   L shoulder flexion: 4/5   L shoulder resisted ER: + pain 4/5           Therapeutic Exercises (CPT 97855):     1. UBE x 2 minutes , level 4, alternating fwd/bwd     2. Pulleys , flexion/scaption 5 minutes     3. Scapular wall slides , x 10     4. Contract/ relax UE IR at 0 degrees , x 10     Therapeutic Treatments and Modalities:     1. Manual Therapy (CPT 54708), PROM AAROM shoulder flexion/scaption/abduction, L GH PA grade II/III, subscap STM, scapular mobs in sidelying elevation/depression/ retraction    2. E Stim Unattended (CPT 91917), IFC and heat x 15 min to left shoulder     Time-based treatments/modalities:  Manual therapy minutes (CPT 39460): 13 minutes  Therapeutic exercise minutes (CPT 73712): 15 minutes       Pain rating before treatment: 5/10     ASSESSMENT:   Response to treatment: Patient tolerated treatment, and has made some ROM gain. With presentation of sxs, patient might benefit from imaging due to slow progression. Continued discussion with postural awareness, recommending towel roll posterior rib cage in sitting to decrease rounded shoulders.    PLAN/RECOMMENDATIONS:   Plan for treatment: therapy treatment to continue next visit. Follow up in one month.   Planned interventions for next visit: continue with current treatment.

## 2019-05-13 ENCOUNTER — OFFICE VISIT (OUTPATIENT)
Dept: CARDIOLOGY | Facility: MEDICAL CENTER | Age: 54
End: 2019-05-13
Payer: COMMERCIAL

## 2019-05-13 VITALS
HEART RATE: 74 BPM | OXYGEN SATURATION: 90 % | DIASTOLIC BLOOD PRESSURE: 98 MMHG | BODY MASS INDEX: 42.68 KG/M2 | HEIGHT: 64 IN | WEIGHT: 250 LBS | SYSTOLIC BLOOD PRESSURE: 140 MMHG

## 2019-05-13 DIAGNOSIS — R01.1 HEART MURMUR: ICD-10-CM

## 2019-05-13 DIAGNOSIS — Q21.0 VENTRICULAR SEPTAL DEFECT (VSD), MEMBRANOUS: ICD-10-CM

## 2019-05-13 PROCEDURE — 99203 OFFICE O/P NEW LOW 30 MIN: CPT | Performed by: INTERNAL MEDICINE

## 2019-05-13 NOTE — PROGRESS NOTES
Chief Complaint   Patient presents with   • Heart Murmur       Subjective:   Zoya Urban is a 54 y.o. female who presents today having been referred by BRYSON Sharif for cardiac evaluation.  She has had a heart murmur since age 4.  Thinks it is due to a small hole in her rather than a valve abnormality.  She had an echocardiogram many years ago but one recently done reveals a small VSD in the membranous portion and is associated with normal pulmonary artery.  The patient is asymptomatic.  She had antibiotic prophylaxis for many years but stopped about 2 or 3 years ago.  She has no orthopnea, PND, pedal edema.  She is unimpaired in her day-to-day activities.  Is not have exertional shortness of breath.  There is no chest pain, pressure, tightness and no fever chills or other problems.  She has a history of normal blood pressures for the most part blood pressure today was slightly elevated.  She has no other cardiac issues.  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  She does walk around in large  stores without shortness of breath.  Does not have diabetes.  She does have modest hyperlipidemia as noted below.  The echocardiographic results are also noted below.    Past Medical History:   Diagnosis Date   • Allergy    • Heart murmur      Past Surgical History:   Procedure Laterality Date   • ABDOMINAL EXPLORATION     • GASTRIC BYPASS LAPAROSCOPIC       Family History   Problem Relation Age of Onset   • Cancer Maternal Aunt         breast   • Cancer Mother    • Cancer Father         prosate   • Lung Disease Father      Social History     Social History   • Marital status:      Spouse name: N/A   • Number of children: N/A   • Years of education: N/A     Occupational History   • Not on file.     Social History Main Topics   • Smoking status: Never Smoker   • Smokeless tobacco: Never Used   • Alcohol use 2.4 oz/week     4 Glasses of wine per week   • Drug use: No   • Sexual activity: Not  "Currently     Other Topics Concern   • Not on file     Social History Narrative   • No narrative on file     Allergies   Allergen Reactions   • Cipro Xr      No outpatient encounter prescriptions on file as of 5/13/2019.     No facility-administered encounter medications on file as of 5/13/2019.      ROS.  See HPI for pertinent negatives.  The patient has no other positive responses in her systems review except for bruisability.     Objective:   /98 (BP Location: Right arm, Patient Position: Sitting)   Pulse 74   Ht 1.626 m (5' 4\")   Wt 113.4 kg (250 lb)   SpO2 90%   BMI 42.91 kg/m²     Physical Exam   Exam:    Vitals:    05/13/19 1537   BP: 140/98   BP Location: Right arm   Patient Position: Sitting   Pulse: 74   SpO2: 90%   Weight: 113.4 kg (250 lb)   Height: 1.626 m (5' 4\")     Constitutional: Well developed, well nourished, obese lady in no acute distress. Appears approximate stated age and provides a good history.  Repeat blood pressure 142/92  HEENT: Normocephalic, pupils are equal and responsive. No arcus. Conjunctiva and sclera are clear. No xanthelasma. No diagonal ear lobe crease noted. Mucus membranes are moist and pink. Good oral hygiene.  Several missing teeth  Neck: No JVD or HJR. JVP = 4-5cm H2O. Good carotid upstroke with no bruit. No lymphadenopathy, No thyroid enlargement.  Cardiovascular: Regular rhythm, no ectopics.  Loud 3/6 holosystolic murmur loudest in the second third intercostal space at the left sternal margin but radiates throughout the precordium.  No diastolic murmur, gallop, rub or click. No lift, heave,  thrill, or cardiomegaly  Lungs: Normal effort, Clear to auscultation and percussion. No rales, rhonchi, wheezing   Abdomen: Soft, non tender, obese. No liver, kidney, spleen or mass palpable. The abdominal aorta is not palpable. Active bowel sounds are noted and there is no bruit  Extremities:  No cyanosis, edema, clubbing. Palpable posterior tibial and dorsal pedal pulses " bilaterally.  Skin:   Warm and dry, no active lesions  Neurologic: Alert & oriented. Strength and sensation grossly intact. No lateralizing signs  Psychiatric:  Affect, mood, and judgement are appropriate    Results for JOVAN HANDY (MRN [id number]) as of 5/13/2019 16:23   Ref. Range 8/19/2017 09:33   Cholesterol,Tot Latest Ref Range: 100 - 199 mg/dL 189   Triglycerides Latest Ref Range: 0 - 149 mg/dL 84   HDL Latest Ref Range: >=40 mg/dL 49   LDL Latest Ref Range: <100 mg/dL 123 (H)       Echocardiography Laboratory    CONCLUSIONS  No prior study is available for comparison.   Normal left ventricular systolic function.  Left ventricular ejection fraction is visually estimated to be 65%.  LV thickness is at the upper limit of normal.  Ventricular septal defect present in the membranous portion of the   septum.  Mildly dilated left atrium.Trace mitral regurgitation.  Structurally normal aortic valve without significant stenosis or   regurgitation.  Trace tricuspid regurgitation.  Estimated right ventricular systolic pressure  is 30 mmHg.  Normal aortic root for body surface area.    Assessment:     1. Heart murmur     2. Ventricular septal defect (VSD), membranous         Medical Decision Making:  Today's Assessment / Status / Plan:   Patient has a loud holosystolic murmur at the left sternal border assistant with small membranous which was confirmed on echocardiography.  There is no right ventricular enlargement and no evidence of significant pulmonary hypertension.  The patient has no symptoms related to this abnormality and it is been present since birth.  There is no specific treatment other than I believe that prophylaxis is probably warranted and discussed this with the patient.    The patient has elevated blood pressure today as noted above.  I did not begin any therapy because of patient's blood pressure on most other occasions has been normal.  Her cholesterol is slightly elevated as noted above.  I  suspect efforts at dietary manipulation and weight loss would be the initial steps toward control..  If her blood pressure is elevated on repeated occasions, treatment with an ACE inhibitor or an ARB would seem reasonable if she is unable to control her cholesterol with dietary restraint, one might consider statin therapy.  These decisions will be left to the primary care provider.    Patient may return here on an as-needed basis.  If there are questions that arise I be happy to see her again.

## 2019-06-10 ENCOUNTER — APPOINTMENT (OUTPATIENT)
Dept: PHYSICAL THERAPY | Facility: REHABILITATION | Age: 54
End: 2019-06-10
Attending: NURSE PRACTITIONER
Payer: COMMERCIAL

## 2019-06-26 ENCOUNTER — TELEPHONE (OUTPATIENT)
Dept: PHYSICAL THERAPY | Facility: REHABILITATION | Age: 54
End: 2019-06-26

## 2019-06-26 NOTE — OP THERAPY DISCHARGE SUMMARY
Outpatient Physical Therapy  DISCHARGE SUMMARY NOTE      [facility] Physical Therapy [address]  [address]  [place] [address]  Phone:  [phone]  Fax:  [phone]    Date of Visit: 06/26/2019    Patient: Zoya Urban  YOB: 1965  MRN: [id number]     Referring Provider: DENNY Collins [facility] [address]  [place] [address]   Referring Diagnosis Pain of left upper arm [M79.622]     Physical Therapy Occurrence Codes    Date of onset of impairment:  11/30/18   Date physical therapy care plan established or reviewed:  3/11/19   Date physical therapy treatment started:  3/11/19          Functional Limitations and Severity Modifiers          Your patient is being discharged from Physical Therapy with the following comments:   · Patient has failed to schedule or reschedule follow-up visits    Comments:  Patient was seen for 5 PT sessions from 03/11/2019 to 05/08/2019 addressing chronic R shoulder pain limiting mobility, washing her hair, and getting dressed. Patient requested to space sessions out due to financial resources, but canceled for June appointment. She has not been seen in > 30 days.     Limitations Remaining:  At last visit, ROM as follows:   Flexion: 110 degrees with pain  Abduction: 90 degrees with pain  Internal rotation BTB: L pelvis   L shoulder flexion: 4/5   L shoulder resisted ER: + pain 4/5     She continues to report pain reaching forward     Recommendations:  D/c from PT at this time.     Lolly Hernandez, PT, DPT    Date: 6/26/2019

## 2020-09-12 ENCOUNTER — HOSPITAL ENCOUNTER (OUTPATIENT)
Dept: RADIOLOGY | Facility: MEDICAL CENTER | Age: 55
End: 2020-09-12
Attending: NURSE PRACTITIONER
Payer: COMMERCIAL

## 2020-09-12 DIAGNOSIS — Z12.31 VISIT FOR SCREENING MAMMOGRAM: ICD-10-CM

## 2020-09-12 PROCEDURE — 77067 SCR MAMMO BI INCL CAD: CPT

## 2021-03-15 DIAGNOSIS — Z23 NEED FOR VACCINATION: ICD-10-CM

## 2021-07-13 ENCOUNTER — HOSPITAL ENCOUNTER (OUTPATIENT)
Dept: RADIOLOGY | Facility: MEDICAL CENTER | Age: 56
End: 2021-07-13
Attending: PHYSICIAN ASSISTANT
Payer: COMMERCIAL

## 2021-07-13 ENCOUNTER — OFFICE VISIT (OUTPATIENT)
Dept: URGENT CARE | Facility: PHYSICIAN GROUP | Age: 56
End: 2021-07-13
Payer: COMMERCIAL

## 2021-07-13 VITALS
TEMPERATURE: 98.3 F | SYSTOLIC BLOOD PRESSURE: 126 MMHG | BODY MASS INDEX: 42.52 KG/M2 | RESPIRATION RATE: 18 BRPM | HEART RATE: 62 BPM | WEIGHT: 240 LBS | HEIGHT: 63 IN | OXYGEN SATURATION: 99 % | DIASTOLIC BLOOD PRESSURE: 90 MMHG

## 2021-07-13 DIAGNOSIS — T14.8XXA HEMATOMA: ICD-10-CM

## 2021-07-13 DIAGNOSIS — M25.551 RIGHT HIP PAIN: ICD-10-CM
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
## 2021-07-13 DIAGNOSIS — S70.01XA CONTUSION OF RIGHT HIP, INITIAL ENCOUNTER: ICD-10-CM

## 2021-07-13 PROCEDURE — 73502 X-RAY EXAM HIP UNI 2-3 VIEWS: CPT | Mod: RT

## 2021-07-13 PROCEDURE — 99214 OFFICE O/P EST MOD 30 MIN: CPT | Performed by: PHYSICIAN ASSISTANT

## 2021-07-13 ASSESSMENT — PAIN SCALES - GENERAL: PAINLEVEL: 8=MODERATE-SEVERE PAIN

## 2021-07-13 NOTE — PROGRESS NOTES
"Subjective:   Zoya Urban is a 56 y.o. female who presents for Fall (right side buttock pain post fall july 3rd)        Patient presents for evaluation of a right buttock pain and swelling that began after a fall on July 3.  Direct impact of firm surface to area. States that she had quite a bit of bruising after the incident.  This is resolving.  Pain is improving.  However patient is concerned by what seems to be worsening swelling at the site of her impact on right buttock.  She states that she is also feeling a little bit of localized numbness and tingling in the area, as well.  Denies low back pain, pain traveling down her leg, saddle dysesthesias, lower extremity weakness, loss of bowel or bladder control. Icing prn and resting.    Review of Systems   Constitutional: Negative for chills and fever.   Gastrointestinal: Negative for nausea and vomiting.   Musculoskeletal: Positive for falls and myalgias. Negative for joint pain.       PMH:  has a past medical history of Allergy and Heart murmur. She also has no past medical history of Breast cancer ([facility]) or Cancer ([facility]).  MEDS:   Current Outpatient Medications:   •  diclofenac sodium 1 % Gel, Apply 2 g topically 3 times a day as needed., Disp: 150 g, Rfl: 0  ALLERGIES:   Allergies   Allergen Reactions   • Cipro Xr      SURGHX:   Past Surgical History:   Procedure Laterality Date   • ABDOMINAL EXPLORATION     • GASTRIC BYPASS LAPAROSCOPIC       SOCHX:  reports that she has never smoked. She has never used smokeless tobacco. She reports current alcohol use of about 2.4 oz of alcohol per week. She reports that she does not use drugs.  FH: Family history was reviewed, no pertinent findings to report   Objective:   /90   Pulse 62   Temp 36.8 °C (98.3 °F)   Resp 18   Ht 1.6 m (5' 3\")   Wt 109 kg (240 lb)   SpO2 99%   BMI 42.51 kg/m²   Physical Exam  Vitals reviewed.   Constitutional:       General: She is not in acute distress.     Appearance: Normal " appearance. She is well-developed. She is not toxic-appearing.   HENT:      Head: Normocephalic and atraumatic.      Right Ear: External ear normal.      Left Ear: External ear normal.      Nose: Nose normal.   Eyes:      General: Gaze aligned appropriately.   Cardiovascular:      Rate and Rhythm: Normal rate and regular rhythm.   Pulmonary:      Effort: Pulmonary effort is normal. No respiratory distress.      Breath sounds: No stridor.   Musculoskeletal:      Cervical back: Neck supple.        Legs:       Comments: Tenderness with palpation of the lesser trochanter.  No tenderness with palpation of greater trochanter.  No pain or instability with pelvic compression.  Lumbar spine nontender to palpation and no bony step-offs or deformities.  Para spinal muscles nontender to palpation. Gait intact. No difficulty moving from seated to standing.   Skin:     General: Skin is warm and dry.      Capillary Refill: Capillary refill takes less than 2 seconds.   Neurological:      Mental Status: She is alert and oriented to person, place, and time.      Comments: CN2-12 grossly intact   Psychiatric:         Speech: Speech normal.         Behavior: Behavior normal.         COMPARISON: None     FINDINGS:  No right hip fracture or dislocation is present.  No significant degenerative change is present.  The bony pelvis is intact.  Bilateral fallopian tube occlusion wires are present.     IMPRESSION:     No radiographic evidence of acute traumatic injury right hip.  Assessment/Plan:   1. Hematoma  - diclofenac sodium 1 % Gel; Apply 2 g topically 3 times a day as needed.  Dispense: 150 g; Refill: 0    2. Contusion of right hip, initial encounter  - diclofenac sodium 1 % Gel; Apply 2 g topically 3 times a day as needed.  Dispense: 150 g; Refill: 0    3. Right hip pain  - diclofenac sodium 1 % Gel; Apply 2 g topically 3 times a day as needed.  Dispense: 150 g; Refill: 0    4. Fall, initial encounter    Patient has substantial,  albeit resolving, bruising on the right buttock.  Additionally there is a moderate sized hematoma overlying the lesser trochanter.  Pelvis feels stable and nontender.  Greater trochanter nontender to palpation.  Lesser trochanter is tender to palpation, however.  Imaging obtained to rule out acute bony injury. Considered lumbar imaging, however pt has no pain here on exam and described localized tingling does not seem radicular. We will skip this for now. Hip/pelvis Imaging is negative.  Imaging results reviewed with patient.  Patient is having a bit of a local reaction to the hematoma, but this does not look like an infection at this point.  It does not appear to be rapidly growing.  We will treat symptomatically and closely follow.  I would like patient to do warm damp compresses several times a day to aid in absorption.  She may also apply topical diclofenac 3 times a day as needed.  Recommend mixing this with a small amount of IcyHot or Biofreeze to potentiate analgesic effects.  Follow-up with PCP next week for a recheck.  I would like patient to be seen sooner with any new or worsening symptoms.  She is unable to get into PCP we are happy to see her in urgent care.    Differential diagnosis, natural history, supportive care, and indications for immediate follow-up discussed.

## 2021-07-13 NOTE — PATIENT INSTRUCTIONS
Hematoma  A hematoma is a collection of blood under the skin, in an organ, in a body space, in a joint space, or in other tissue. The blood can thicken (clot) to form a lump that you can see and feel. The lump is often firm and may become sore and tender. Most hematomas get better in a few days to weeks. However, some hematomas may be serious and require medical care. Hematomas can range from very small to very large.  What are the causes?  This condition is caused by:  · A blunt or penetrating injury.  · A leakage from a blood vessel under the skin.  · Some medical procedures, including surgeries, such as oral surgery, face lifts, and surgeries on the joints.  · Some medical conditions that cause bleeding or bruising. There may be multiple hematomas that appear in different areas of the body.  What increases the risk?  You are more likely to develop this condition if:  · You are an older adult.  · You use blood thinners.  What are the signs or symptoms?    Symptoms of this condition depend on where the hematoma is located.   Common symptoms of a hematoma that is under the skin include:  · A firm lump on the body.  · Pain and tenderness in the area.  · Bruising. Blue, dark blue, purple-red, or yellowish skin (discoloration) may appear at the site of the hematoma if the hematoma is close to the surface of the skin.  Common symptoms of a hematoma that is deep in the tissues or body spaces may be less obvious. They include:  · A collection of blood in the stomach (intra-abdominal hematoma). This may cause pain in the abdomen, weakness, fainting, and shortness of breath.  · A collection of blood in the head (intracranial hematoma). This may cause a headache or symptoms such as weakness, trouble speaking or understanding, or a change in consciousness.   How is this diagnosed?  This condition is diagnosed based on:  · Your medical history.  · A physical exam.  · Imaging tests, such as an ultrasound or CT scan. These may  be needed if your health care provider suspects a hematoma in deeper tissues or body spaces.  · Blood tests. These may be needed if your health care provider believes that the hematoma is caused by a medical condition.  How is this treated?  Treatment for this condition depends on the cause, size, and location of the hematoma. Treatment may include:  · Doing nothing. The majority of hematomas do not need treatment as many of them go away on their own over time.  · Surgery or close monitoring. This may be needed for large hematomas or hematomas that affect vital organs.  · Medicines. Medicines may be given if there is an underlying medical cause for the hematoma.  Follow these instructions at home:  Managing pain, stiffness, and swelling    · If directed, put ice on the affected area.  ? Put ice in a plastic bag.  ? Place a towel between your skin and the bag.  ? Leave the ice on for 20 minutes, 2-3 times a day for the first couple of days.  · If directed, apply heat to the affected area after applying ice for a couple of days. Use the heat source that your health care provider recommends, such as a moist heat pack or a heating pad.  ? Place a towel between your skin and the heat source.  ? Leave the heat on for 20-30 minutes.  ? Remove the heat if your skin turns bright red. This is especially important if you are unable to feel pain, heat, or cold. You may have a greater risk of getting burned.  · Raise (elevate) the affected area above the level of your heart while you are sitting or lying down.  · If told, wrap the affected area with an elastic bandage. The bandage applies pressure (compression) to the area, which may help to reduce swelling and promote healing. Do not wrap the bandage too tightly around the affected area.  · If your hematoma is on a leg or foot (lower extremity) and is painful, your health care provider may recommend crutches. Use them as told by your health care provider.  General  instructions  · Take over-the-counter and prescription medicines only as told by your health care provider.  · Keep all follow-up visits as told by your health care provider. This is important.  Contact a health care provider if:  · You have a fever.  · The swelling or discoloration gets worse.  · You develop more hematomas.  Get help right away if:  · Your pain is worse or your pain is not controlled with medicine.  · Your skin over the hematoma breaks or starts bleeding.  · Your hematoma is in your chest or abdomen and you have weakness, shortness of breath, or a change in consciousness.  · You have a hematoma on your scalp that is caused by a fall or injury, and you also have:  ? A headache that gets worse.  ? Trouble speaking or understanding speech.  ? Weakness.  ? Change in alertness or consciousness.  Summary  · A hematoma is a collection of blood under the skin, in an organ, in a body space, in a joint space, or in other tissue.  · This condition usually does not need treatment because many hematomas go away on their own over time.  · Large hematomas, or those that may affect vital organs, may need surgical drainage or monitoring. If the hematoma is caused by a medical condition, medicines may be prescribed.  · Get help right away if your hematoma breaks or starts to bleed, you have shortness of breath, or you have a headache or trouble speaking after a fall.  This information is not intended to replace advice given to you by your health care provider. Make sure you discuss any questions you have with your health care provider.  Document Released: 08/01/2005 Document Revised: 05/23/2019 Document Reviewed: 05/23/2019  Elsevier Patient Education © 2020 [employer]

## 2021-07-14 ASSESSMENT — ENCOUNTER SYMPTOMS
VOMITING: 0
CHILLS: 0
FALLS: 1
NAUSEA: 0
FEVER: 0
MYALGIAS: 1

## 2022-01-11 ENCOUNTER — OFFICE VISIT (OUTPATIENT)
Dept: URGENT CARE | Facility: CLINIC | Age: 57
End: 2022-01-11
Payer: COMMERCIAL

## 2022-01-11 ENCOUNTER — HOSPITAL ENCOUNTER (OUTPATIENT)
Facility: MEDICAL CENTER | Age: 57
End: 2022-01-11
Attending: NURSE PRACTITIONER
Payer: COMMERCIAL

## 2022-01-11 VITALS
DIASTOLIC BLOOD PRESSURE: 90 MMHG | TEMPERATURE: 98.9 F | BODY MASS INDEX: 43.41 KG/M2 | WEIGHT: 245 LBS | HEIGHT: 63 IN | HEART RATE: 81 BPM | RESPIRATION RATE: 20 BRPM | SYSTOLIC BLOOD PRESSURE: 120 MMHG | OXYGEN SATURATION: 94 %

## 2022-01-11 DIAGNOSIS — R05.9 COUGH: ICD-10-CM

## 2022-01-11 DIAGNOSIS — J98.8 RTI (RESPIRATORY TRACT INFECTION): ICD-10-CM

## 2022-01-11 PROCEDURE — U0005 INFEC AGEN DETEC AMPLI PROBE: HCPCS

## 2022-01-11 PROCEDURE — 99213 OFFICE O/P EST LOW 20 MIN: CPT | Performed by: NURSE PRACTITIONER

## 2022-01-11 PROCEDURE — U0003 INFECTIOUS AGENT DETECTION BY NUCLEIC ACID (DNA OR RNA); SEVERE ACUTE RESPIRATORY SYNDROME CORONAVIRUS 2 (SARS-COV-2) (CORONAVIRUS DISEASE [COVID-19]), AMPLIFIED PROBE TECHNIQUE, MAKING USE OF HIGH THROUGHPUT TECHNOLOGIES AS DESCRIBED BY CMS-2020-01-R: HCPCS

## 2022-01-11 ASSESSMENT — ENCOUNTER SYMPTOMS
EYE PAIN: 0
MYALGIAS: 0
DIZZINESS: 0
SHORTNESS OF BREATH: 0
FEVER: 0
COUGH: 1
VOMITING: 0
NAUSEA: 0
RHINORRHEA: 1
ABDOMINAL PAIN: 0
CHILLS: 0
SORE THROAT: 0
HEADACHES: 1

## 2022-01-11 NOTE — PROGRESS NOTES
Subjective:   Zoya Urban is a 56 y.o. female who presents for Headache (pressure on chest, runny nose. x today )      URI   This is a new problem. The current episode started today (Note sick contacts, is vaccinated for COVID has not received a booster). The problem has been unchanged. There has been no fever. Associated symptoms include congestion, coughing, headaches and rhinorrhea. Pertinent negatives include no abdominal pain, chest pain, ear pain, nausea, rash, sore throat or vomiting. She has tried acetaminophen for the symptoms. The treatment provided no relief.       Review of Systems   Constitutional: Positive for malaise/fatigue. Negative for chills and fever.   HENT: Positive for congestion and rhinorrhea. Negative for ear pain and sore throat.    Eyes: Negative for pain.   Respiratory: Positive for cough. Negative for shortness of breath.    Cardiovascular: Negative for chest pain.   Gastrointestinal: Negative for abdominal pain, nausea and vomiting.   Genitourinary: Negative for hematuria.   Musculoskeletal: Negative for myalgias.   Skin: Negative for rash.   Neurological: Positive for headaches. Negative for dizziness.       Medications:    • This patient does not have an active medication from one of the medication groupers.    Allergies: Cipro xr    Problem List: Zoya Urban does not have any pertinent problems on file.    Surgical History:  Past Surgical History:   Procedure Laterality Date   • ABDOMINAL EXPLORATION     • GASTRIC BYPASS LAPAROSCOPIC         Past Social Hx: Zoya Urban  reports that she has never smoked. She has never used smokeless tobacco. She reports current alcohol use of about 2.4 oz of alcohol per week. She reports that she does not use drugs.     Past Family Hx:  Zoya Urban family history includes Cancer in her father, maternal aunt, and mother; Lung Disease in her father.     Problem list, medications, and allergies reviewed by myself today in  "Epic.     Objective:     /90   Pulse 81   Temp 37.2 °C (98.9 °F)   Resp 20   Ht 1.6 m (5' 3\")   Wt 111 kg (245 lb)   SpO2 94%   BMI 43.40 kg/m²     Physical Exam  Vitals and nursing note reviewed.   Constitutional:       General: She is not in acute distress.     Appearance: She is well-developed.   HENT:      Head: Normocephalic and atraumatic.      Right Ear: Tympanic membrane and external ear normal.      Left Ear: Tympanic membrane and external ear normal.      Nose: Nose normal.      Right Sinus: No maxillary sinus tenderness or frontal sinus tenderness.      Left Sinus: No maxillary sinus tenderness or frontal sinus tenderness.      Mouth/Throat:      Mouth: Mucous membranes are moist.      Pharynx: Uvula midline. No posterior oropharyngeal erythema.      Tonsils: No tonsillar exudate or tonsillar abscesses.   Eyes:      General:         Right eye: No discharge.         Left eye: No discharge.      Conjunctiva/sclera: Conjunctivae normal.   Cardiovascular:      Rate and Rhythm: Normal rate.      Heart sounds: Murmur heard.       Pulmonary:      Effort: Pulmonary effort is normal. No respiratory distress.      Breath sounds: Normal breath sounds.   Abdominal:      General: There is no distension.   Musculoskeletal:         General: Normal range of motion.   Skin:     General: Skin is warm and dry.   Neurological:      General: No focal deficit present.      Mental Status: She is alert and oriented to person, place, and time. Mental status is at baseline.      Gait: Gait (gait at baseline) normal.   Psychiatric:         Judgment: Judgment normal.         Assessment/Plan:     Diagnosis and associated orders:     1. Cough  SARS-CoV-2 PCR (24 hour In-House): Collect NP swab in [facility]   2. RTI (respiratory tract infection)        Comments/MDM:     The patient's presenting symptoms and exam findings most likely are due to a viral etiology.     Test for COVID-19 via PCR. Result will be reviewed by myself. We " will call/message back for results and appropriate further instructions. Instructed to sign up for [employer] if they have not already. Result will be automatically released to [employer] application for patient review. I will be sending a message with Next Step Instructions to [employer] soon after resulted.   Symptomatic and supportive care:   Plenty of oral hydration and rest   Over the counter cough suppressant as directed.  Tylenol or ibuprofen for pain and fever as directed.   Warm salt water gargles for sore throat, soft foods, cool liquids.   Saline nasal spray and Flonase as a decongestant.   Infection control measures at home. Stay away from people, Hand washing, covering sneeze/cough, disinfect surfaces.   Remain home from work, school, and other populated environments. Work note provided with information of quarantine measures per CDC guidelines.   Overall, the patient is well-appearing. They are not hypoxic, afebrile, and a normal pulmonary exam.      •                  Please note that this dictation was created using voice recognition software. I have made a reasonable attempt to correct obvious errors, but I expect that there are errors of grammar and possibly content that I did not discover before finalizing the note.    This note was electronically signed by Jez ROSALES.

## 2022-01-12 DIAGNOSIS — R05.9 COUGH: ICD-10-CM

## 2022-01-12 LAB — COVID ORDER STATUS COVID19: NORMAL

## 2022-01-13 LAB
SARS-COV-2 RNA RESP QL NAA+PROBE: DETECTED
SPECIMEN SOURCE: ABNORMAL

## 2024-03-08 ENCOUNTER — HOSPITAL ENCOUNTER (OUTPATIENT)
Dept: RADIOLOGY | Facility: MEDICAL CENTER | Age: 59
End: 2024-03-08
Attending: STUDENT IN AN ORGANIZED HEALTH CARE EDUCATION/TRAINING PROGRAM
Payer: COMMERCIAL

## 2024-03-08 DIAGNOSIS — Z12.31 VISIT FOR SCREENING MAMMOGRAM: ICD-10-CM

## 2024-03-08 PROCEDURE — 77067 SCR MAMMO BI INCL CAD: CPT

## 2024-04-30 ENCOUNTER — OFFICE VISIT (OUTPATIENT)
Dept: MEDICAL GROUP | Facility: LAB | Age: 59
End: 2024-04-30
Payer: COMMERCIAL

## 2024-04-30 VITALS
WEIGHT: 235.01 LBS | DIASTOLIC BLOOD PRESSURE: 68 MMHG | TEMPERATURE: 97.7 F | HEIGHT: 63 IN | RESPIRATION RATE: 20 BRPM | OXYGEN SATURATION: 95 % | SYSTOLIC BLOOD PRESSURE: 122 MMHG | BODY MASS INDEX: 41.64 KG/M2 | HEART RATE: 68 BPM

## 2024-04-30 DIAGNOSIS — Z98.84 PERSONAL HISTORY OF GASTRIC BYPASS: ICD-10-CM

## 2024-04-30 DIAGNOSIS — Q21.0 VENTRICULAR SEPTAL DEFECT (VSD), MEMBRANOUS: ICD-10-CM

## 2024-04-30 DIAGNOSIS — Z11.59 NEED FOR HEPATITIS C SCREENING TEST: ICD-10-CM

## 2024-04-30 DIAGNOSIS — E66.01 MORBID (SEVERE) OBESITY DUE TO EXCESS CALORIES (HCC): ICD-10-CM

## 2024-04-30 DIAGNOSIS — E78.5 HYPERLIPIDEMIA, UNSPECIFIED HYPERLIPIDEMIA TYPE: ICD-10-CM

## 2024-04-30 DIAGNOSIS — Z11.4 SCREENING FOR HIV WITHOUT PRESENCE OF RISK FACTORS: ICD-10-CM

## 2024-04-30 PROBLEM — J98.4 RESTRICTIVE LUNG DISEASE: Status: ACTIVE | Noted: 2024-04-30

## 2024-04-30 ASSESSMENT — ENCOUNTER SYMPTOMS
COUGH: 0
SHORTNESS OF BREATH: 0
HEARTBURN: 0
BLOOD IN STOOL: 0
CHILLS: 0
NAUSEA: 0
PALPITATIONS: 1
ABDOMINAL PAIN: 0
DIARRHEA: 0
FEVER: 0
VOMITING: 0
CONSTIPATION: 0
WEIGHT LOSS: 0

## 2024-04-30 ASSESSMENT — PATIENT HEALTH QUESTIONNAIRE - PHQ9: CLINICAL INTERPRETATION OF PHQ2 SCORE: 0

## 2024-04-30 NOTE — PATIENT INSTRUCTIONS
Vaccines due that can be received only at pharmacy:  COVID  RSV (respiratory syncytial virus)  Shingrix series (shingles)     fasting labs due 1-2 weeks prior to annual

## 2024-04-30 NOTE — PROGRESS NOTES
"Subjective:     Chief Complaint   Patient presents with    Establish Care     HISTORY OF THE PRESENT ILLNESS: Patient is a 59 y.o. female. This pleasant patient is here today to establish care. His/her prior PCP was BRYSON Adamson.    The patient without any complaints or concerns.  History reviewed.  Patient takes no supplements or vitamins.  Denies any GI complaints.  Reports infrequent/rare flutters but no palpitations, chest pain, shortness of breath, lower extremity edema or paroxysmal nocturnal dyspnea.    Health Maintenance: Declines vaccines.  Will schedule for Pap smear.    Review of Systems   Constitutional:  Negative for chills, fever, malaise/fatigue and weight loss.   Respiratory:  Negative for cough and shortness of breath.    Cardiovascular:  Positive for palpitations (rare flutters, very brief). Negative for chest pain.   Gastrointestinal:  Negative for abdominal pain, blood in stool, constipation, diarrhea, heartburn, nausea and vomiting.   Skin:  Negative for rash.     Objective:     Exam: /68 (BP Location: Left arm, Patient Position: Sitting, BP Cuff Size: Adult)   Pulse 68   Temp 36.5 °C (97.7 °F) (Temporal)   Resp 20   Ht 1.6 m (5' 3\")   Wt 107 kg (235 lb 0.2 oz)   SpO2 95%  Body mass index is 41.63 kg/m².    Physical Exam  Vitals reviewed.   Constitutional:       General: She is not in acute distress.     Appearance: Normal appearance. She is morbidly obese. She is not ill-appearing.   HENT:      Head: Normocephalic and atraumatic.      Nose: Nose normal.      Mouth/Throat:      Mouth: Mucous membranes are moist.   Eyes:      Conjunctiva/sclera: Conjunctivae normal.   Cardiovascular:      Rate and Rhythm: Normal rate and regular rhythm.      Heart sounds: Murmur (Best heard at LUSB, holosystolic) heard.      Systolic murmur is present with a grade of 3/6.   Pulmonary:      Effort: Pulmonary effort is normal. No respiratory distress.      Breath sounds: Normal breath sounds. No " stridor. No wheezing, rhonchi or rales.   Musculoskeletal:      Cervical back: Normal range of motion and neck supple. No rigidity or tenderness.      Right lower leg: No edema.      Left lower leg: No edema.   Neurological:      General: No focal deficit present.      Mental Status: She is alert and oriented to person, place, and time.   Psychiatric:         Mood and Affect: Mood normal.         Behavior: Behavior normal.         Thought Content: Thought content normal.       Labs: Reviewed from 2017  Imaging: Reviewed from 3/6/2019    Assessment & Plan:   59 y.o. female with the following -    1. Hyperlipidemia, unspecified hyperlipidemia type  Chronic, not at threshold for statin prior. Discussed ideal ranges and ways to improve with lifestyle choices.  Trend, plan pending results.  - Comp Metabolic Panel; Future  - TSH WITH REFLEX TO FT4; Future  - Lipid Profile; Future    2. Personal history of gastric bypass  Evaluate for nutritional deficiencies.  - VITAMIN D,25 HYDROXY (DEFICIENCY); Future  - CBC WITH DIFFERENTIAL; Future  - FERRITIN; Future  - IRON/TOTAL IRON BIND; Future  - VITAMIN B12; Future  - FOLATE; Future    3. Ventricular septal defect (VSD), membranous  Chronic, asymptomatic aside from very rare/brief flutters with stable exam from prior-cardiology note reviewed.  Gave return precautions.  Consider referral back to cardiology and repeat echocardiogram if indicated.    4. Need for hepatitis C screening test  - HEP C VIRUS ANTIBODY; Future    5. Screening for HIV without presence of risk factors  - HIV AG/AB COMBO ASSAY SCREENING; Future    I spent a total of 33 minutes with record review, exam, communication with the patient, and documentation of this encounter.    HCC Gap Form    Diagnosis: E66.01 - Morbid (severe) obesity due to excess calories (HCC)  Z68.41 - Body mass index (BMI) 40.0-44.9, adult (HCC)  The current BMI is 41.63 kg/m2 as of 04/30/24 12:41 PDT  Assessment and plan: Chronic,  improved from years ago. Encouraged healthy diet and physical activity changes with a goal of weight loss. Follow up at least annually.  Last edited 04/30/24 12:42 PDT by Marleny Yanes D.O.       Return if symptoms worsen or fail to improve, for Annual with PAP.    Please note that this dictation was created using voice recognition software. I have made every reasonable attempt to correct obvious errors, but I expect that there are errors of grammar and possibly content that I did not discover before finalizing the note.

## 2024-06-15 ENCOUNTER — HOSPITAL ENCOUNTER (OUTPATIENT)
Dept: LAB | Facility: MEDICAL CENTER | Age: 59
End: 2024-06-15
Attending: STUDENT IN AN ORGANIZED HEALTH CARE EDUCATION/TRAINING PROGRAM
Payer: COMMERCIAL

## 2024-06-15 DIAGNOSIS — Z11.59 NEED FOR HEPATITIS C SCREENING TEST: ICD-10-CM

## 2024-06-15 DIAGNOSIS — Z98.84 PERSONAL HISTORY OF GASTRIC BYPASS: ICD-10-CM

## 2024-06-15 DIAGNOSIS — E78.5 HYPERLIPIDEMIA, UNSPECIFIED HYPERLIPIDEMIA TYPE: ICD-10-CM

## 2024-06-15 DIAGNOSIS — Z11.4 SCREENING FOR HIV WITHOUT PRESENCE OF RISK FACTORS: ICD-10-CM

## 2024-06-15 PROCEDURE — 80061 LIPID PANEL: CPT

## 2024-06-15 PROCEDURE — 82306 VITAMIN D 25 HYDROXY: CPT

## 2024-06-15 PROCEDURE — 82728 ASSAY OF FERRITIN: CPT

## 2024-06-15 PROCEDURE — 84443 ASSAY THYROID STIM HORMONE: CPT

## 2024-06-15 PROCEDURE — 36415 COLL VENOUS BLD VENIPUNCTURE: CPT

## 2024-06-15 PROCEDURE — 86803 HEPATITIS C AB TEST: CPT

## 2024-06-15 PROCEDURE — 87389 HIV-1 AG W/HIV-1&-2 AB AG IA: CPT

## 2024-06-15 PROCEDURE — 83550 IRON BINDING TEST: CPT

## 2024-06-15 PROCEDURE — 80053 COMPREHEN METABOLIC PANEL: CPT

## 2024-06-15 PROCEDURE — 82607 VITAMIN B-12: CPT

## 2024-06-15 PROCEDURE — 83540 ASSAY OF IRON: CPT

## 2024-06-15 PROCEDURE — 82746 ASSAY OF FOLIC ACID SERUM: CPT

## 2024-06-15 PROCEDURE — 85025 COMPLETE CBC W/AUTO DIFF WBC: CPT

## 2024-06-16 LAB
25(OH)D3 SERPL-MCNC: 15 NG/ML (ref 30–100)
ALBUMIN SERPL BCP-MCNC: 3.6 G/DL (ref 3.2–4.9)
ALBUMIN/GLOB SERPL: 1.3 G/DL
ALP SERPL-CCNC: 85 U/L (ref 30–99)
ALT SERPL-CCNC: 16 U/L (ref 2–50)
ANION GAP SERPL CALC-SCNC: 11 MMOL/L (ref 7–16)
AST SERPL-CCNC: 18 U/L (ref 12–45)
BASOPHILS # BLD AUTO: 0.7 % (ref 0–1.8)
BASOPHILS # BLD: 0.04 K/UL (ref 0–0.12)
BILIRUB SERPL-MCNC: 1 MG/DL (ref 0.1–1.5)
BUN SERPL-MCNC: 12 MG/DL (ref 8–22)
CALCIUM ALBUM COR SERPL-MCNC: 8.9 MG/DL (ref 8.5–10.5)
CALCIUM SERPL-MCNC: 8.6 MG/DL (ref 8.5–10.5)
CHLORIDE SERPL-SCNC: 105 MMOL/L (ref 96–112)
CHOLEST SERPL-MCNC: 172 MG/DL (ref 100–199)
CO2 SERPL-SCNC: 24 MMOL/L (ref 20–33)
CREAT SERPL-MCNC: 0.68 MG/DL (ref 0.5–1.4)
EOSINOPHIL # BLD AUTO: 0.06 K/UL (ref 0–0.51)
EOSINOPHIL NFR BLD: 1 % (ref 0–6.9)
ERYTHROCYTE [DISTWIDTH] IN BLOOD BY AUTOMATED COUNT: 61.4 FL (ref 35.9–50)
FERRITIN SERPL-MCNC: 91.9 NG/ML (ref 10–291)
FOLATE SERPL-MCNC: 10.9 NG/ML
GFR SERPLBLD CREATININE-BSD FMLA CKD-EPI: 99 ML/MIN/1.73 M 2
GLOBULIN SER CALC-MCNC: 2.8 G/DL (ref 1.9–3.5)
GLUCOSE SERPL-MCNC: 89 MG/DL (ref 65–99)
HCT VFR BLD AUTO: 43.1 % (ref 37–47)
HCV AB SER QL: NORMAL
HDLC SERPL-MCNC: 52 MG/DL
HGB BLD-MCNC: 14.1 G/DL (ref 12–16)
HIV 1+2 AB+HIV1 P24 AG SERPL QL IA: NORMAL
IMM GRANULOCYTES # BLD AUTO: 0.02 K/UL (ref 0–0.11)
IMM GRANULOCYTES NFR BLD AUTO: 0.3 % (ref 0–0.9)
IRON SATN MFR SERPL: 43 % (ref 15–55)
IRON SERPL-MCNC: 119 UG/DL (ref 40–170)
LDLC SERPL CALC-MCNC: 105 MG/DL
LYMPHOCYTES # BLD AUTO: 1.39 K/UL (ref 1–4.8)
LYMPHOCYTES NFR BLD: 24.1 % (ref 22–41)
MCH RBC QN AUTO: 35.9 PG (ref 27–33)
MCHC RBC AUTO-ENTMCNC: 32.7 G/DL (ref 32.2–35.5)
MCV RBC AUTO: 109.7 FL (ref 81.4–97.8)
MONOCYTES # BLD AUTO: 0.64 K/UL (ref 0–0.85)
MONOCYTES NFR BLD AUTO: 11.1 % (ref 0–13.4)
NEUTROPHILS # BLD AUTO: 3.61 K/UL (ref 1.82–7.42)
NEUTROPHILS NFR BLD: 62.8 % (ref 44–72)
NRBC # BLD AUTO: 0 K/UL
NRBC BLD-RTO: 0 /100 WBC (ref 0–0.2)
PLATELET # BLD AUTO: 266 K/UL (ref 164–446)
PMV BLD AUTO: 9.9 FL (ref 9–12.9)
POTASSIUM SERPL-SCNC: 4.5 MMOL/L (ref 3.6–5.5)
PROT SERPL-MCNC: 6.4 G/DL (ref 6–8.2)
RBC # BLD AUTO: 3.93 M/UL (ref 4.2–5.4)
SODIUM SERPL-SCNC: 140 MMOL/L (ref 135–145)
TIBC SERPL-MCNC: 276 UG/DL (ref 250–450)
TRIGL SERPL-MCNC: 75 MG/DL (ref 0–149)
TSH SERPL DL<=0.005 MIU/L-ACNC: 2.14 UIU/ML (ref 0.38–5.33)
UIBC SERPL-MCNC: 157 UG/DL (ref 110–370)
VIT B12 SERPL-MCNC: <150 PG/ML (ref 211–911)
WBC # BLD AUTO: 5.8 K/UL (ref 4.8–10.8)

## 2024-06-28 ENCOUNTER — APPOINTMENT (OUTPATIENT)
Dept: MEDICAL GROUP | Facility: LAB | Age: 59
End: 2024-06-28
Payer: COMMERCIAL

## 2024-07-11 ENCOUNTER — HOSPITAL ENCOUNTER (OUTPATIENT)
Facility: MEDICAL CENTER | Age: 59
End: 2024-07-11
Attending: STUDENT IN AN ORGANIZED HEALTH CARE EDUCATION/TRAINING PROGRAM
Payer: COMMERCIAL

## 2024-07-11 ENCOUNTER — APPOINTMENT (OUTPATIENT)
Dept: MEDICAL GROUP | Facility: LAB | Age: 59
End: 2024-07-11
Payer: COMMERCIAL

## 2024-07-11 VITALS
SYSTOLIC BLOOD PRESSURE: 108 MMHG | WEIGHT: 234.79 LBS | HEIGHT: 63 IN | RESPIRATION RATE: 16 BRPM | HEART RATE: 70 BPM | TEMPERATURE: 97.1 F | OXYGEN SATURATION: 96 % | BODY MASS INDEX: 41.6 KG/M2 | DIASTOLIC BLOOD PRESSURE: 68 MMHG

## 2024-07-11 DIAGNOSIS — E78.00 ELEVATED LDL CHOLESTEROL LEVEL: ICD-10-CM

## 2024-07-11 DIAGNOSIS — H91.90 SUBJECTIVE HEARING LOSS: ICD-10-CM

## 2024-07-11 DIAGNOSIS — E53.8 B12 DEFICIENCY: ICD-10-CM

## 2024-07-11 DIAGNOSIS — D75.89 MACROCYTOSIS: ICD-10-CM

## 2024-07-11 DIAGNOSIS — Z01.419 WELL WOMAN EXAM: ICD-10-CM

## 2024-07-11 DIAGNOSIS — R01.1 HEART MURMUR: ICD-10-CM

## 2024-07-11 DIAGNOSIS — Z12.4 ENCOUNTER FOR PAPANICOLAOU SMEAR FOR CERVICAL CANCER SCREENING: ICD-10-CM

## 2024-07-11 DIAGNOSIS — Q21.0 VENTRICULAR SEPTAL DEFECT (VSD), MEMBRANOUS: ICD-10-CM

## 2024-07-11 DIAGNOSIS — Z98.84 PERSONAL HISTORY OF GASTRIC BYPASS: ICD-10-CM

## 2024-07-11 DIAGNOSIS — Z11.51 ENCOUNTER FOR SCREENING FOR HUMAN PAPILLOMAVIRUS (HPV): ICD-10-CM

## 2024-07-11 DIAGNOSIS — E55.9 VITAMIN D DEFICIENCY: ICD-10-CM

## 2024-07-11 PROCEDURE — 88175 CYTOPATH C/V AUTO FLUID REDO: CPT

## 2024-07-11 PROCEDURE — 87624 HPV HI-RISK TYP POOLED RSLT: CPT

## 2024-07-11 PROCEDURE — 3078F DIAST BP <80 MM HG: CPT | Performed by: STUDENT IN AN ORGANIZED HEALTH CARE EDUCATION/TRAINING PROGRAM

## 2024-07-11 PROCEDURE — 3074F SYST BP LT 130 MM HG: CPT | Performed by: STUDENT IN AN ORGANIZED HEALTH CARE EDUCATION/TRAINING PROGRAM

## 2024-07-11 PROCEDURE — 99396 PREV VISIT EST AGE 40-64: CPT | Performed by: STUDENT IN AN ORGANIZED HEALTH CARE EDUCATION/TRAINING PROGRAM

## 2024-07-11 PROCEDURE — 99459 PELVIC EXAMINATION: CPT | Performed by: STUDENT IN AN ORGANIZED HEALTH CARE EDUCATION/TRAINING PROGRAM

## 2024-07-11 PROCEDURE — 99213 OFFICE O/P EST LOW 20 MIN: CPT | Mod: 25 | Performed by: STUDENT IN AN ORGANIZED HEALTH CARE EDUCATION/TRAINING PROGRAM

## 2024-07-11 ASSESSMENT — FIBROSIS 4 INDEX: FIB4 SCORE: 1.03

## 2024-07-18 LAB
CYTOLOGIST CVX/VAG CYTO: NORMAL
CYTOLOGY CVX/VAG DOC CYTO: NORMAL
CYTOLOGY CVX/VAG DOC THIN PREP: NORMAL
HPV I/H RISK 4 DNA CVX QL PROBE+SIG AMP: NEGATIVE
NOTE NL11727A: NORMAL
OTHER STN SPEC: NORMAL
QC REVIEWED BY NL11722A: NORMAL
STAT OF ADQ CVX/VAG CYTO-IMP: NORMAL

## 2024-09-22 ENCOUNTER — OFFICE VISIT (OUTPATIENT)
Dept: URGENT CARE | Facility: CLINIC | Age: 59
End: 2024-09-22
Payer: COMMERCIAL

## 2024-09-22 VITALS
RESPIRATION RATE: 18 BRPM | HEIGHT: 63 IN | DIASTOLIC BLOOD PRESSURE: 70 MMHG | WEIGHT: 235.8 LBS | TEMPERATURE: 97.5 F | SYSTOLIC BLOOD PRESSURE: 122 MMHG | HEART RATE: 88 BPM | BODY MASS INDEX: 41.78 KG/M2 | OXYGEN SATURATION: 95 %

## 2024-09-22 DIAGNOSIS — S46.912A STRAIN OF LEFT ELBOW, INITIAL ENCOUNTER: ICD-10-CM

## 2024-09-22 PROCEDURE — 3074F SYST BP LT 130 MM HG: CPT

## 2024-09-22 PROCEDURE — 3078F DIAST BP <80 MM HG: CPT

## 2024-09-22 PROCEDURE — 99213 OFFICE O/P EST LOW 20 MIN: CPT

## 2024-09-22 ASSESSMENT — ENCOUNTER SYMPTOMS
FALLS: 0
DIARRHEA: 0
MYALGIAS: 0
FEVER: 0
NAUSEA: 0
COUGH: 0
BACK PAIN: 0
HEADACHES: 0
VOMITING: 0
SORE THROAT: 0
NECK PAIN: 0
CHILLS: 0

## 2024-09-22 ASSESSMENT — FIBROSIS 4 INDEX: FIB4 SCORE: 1.03

## 2024-09-23 NOTE — PROGRESS NOTES
"Subjective:   Zoya Urban is a 59 y.o. female who presents for Arm Injury (X4-5 weeks: When moving arm and reaching for items/getting dressed there's pain in L arm. R wrist has pain when moving. )      Patient presents with complaints of left arm soreness for the last 4 to 5 weeks.  Patient denies any recent falls, trauma, or injury to the area.  States that initially area was tender to the touch, and now is nontender to the touch though she does have some pain primarily with extension at her elbow as well as pronation of her left arm.    Arm Injury  Pertinent negatives include no chest pain, chills, congestion, coughing, fever, headaches, myalgias, nausea, neck pain, rash, sore throat or vomiting.       Review of Systems   Constitutional:  Negative for chills and fever.   HENT:  Negative for congestion and sore throat.    Respiratory:  Negative for cough.    Cardiovascular:  Negative for chest pain.   Gastrointestinal:  Negative for diarrhea, nausea and vomiting.   Musculoskeletal:  Positive for joint pain. Negative for back pain, falls, myalgias and neck pain.   Skin:  Negative for rash.   Neurological:  Negative for headaches.       Medications, Allergies, and current problem list reviewed today in Epic.     Objective:     /70   Pulse 88   Temp 36.4 °C (97.5 °F)   Resp 18   Ht 1.6 m (5' 3\")   Wt 107 kg (235 lb 12.8 oz)   SpO2 95%     Physical Exam  Vitals reviewed.   Constitutional:       General: She is not in acute distress.     Appearance: She is normal weight. She is not ill-appearing.   HENT:      Head: Normocephalic and atraumatic.   Cardiovascular:      Rate and Rhythm: Normal rate.      Pulses: Normal pulses.   Pulmonary:      Effort: Pulmonary effort is normal.   Musculoskeletal:      Right elbow: Normal.      Left elbow: No swelling, deformity, effusion or lacerations. Decreased range of motion. No tenderness.        Arms:       Cervical back: Normal and normal range of motion. No " "rigidity or tenderness. Normal range of motion.      Thoracic back: No tenderness or bony tenderness. Normal range of motion.      Lumbar back: No swelling, tenderness or bony tenderness. Normal range of motion. Negative right straight leg raise test and negative left straight leg raise test.      Comments: Pain with pronation and extension of the left elbow   Neurological:      Mental Status: She is alert.         Assessment/Plan:     Diagnosis and associated orders:     1. Strain of left elbow, initial encounter     Comments/MDM:     Patient's history and physical exam are consistent with overuse injury and strain of the left elbow.  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  Discussed extensively with patient about myofascial release via foam roller/tennis ball/lacrosse ball.  Discussed proper body mechanics and posture while at work  Tylenol/NSAIDs as needed for increases or breakthrough pain if symptoms do not begin to improve within the next 2 weeks after the above treatment plan please seek further evaluation         Differential diagnosis, natural history, supportive care, and indications for immediate follow-up discussed.    Advised the patient to follow-up with the primary care physician for recheck, reevaluation, and consideration of further management.    Please note that this dictation was created using voice recognition software. I have made a reasonable attempt to correct obvious errors, but I expect that there are errors of grammar and possibly content that I did not discover before finalizing the note.    This note was electronically signed by ANIYA Smith      "

## 2024-11-05 ENCOUNTER — HOSPITAL ENCOUNTER (OUTPATIENT)
Dept: CARDIOLOGY | Facility: MEDICAL CENTER | Age: 59
End: 2024-11-05
Attending: STUDENT IN AN ORGANIZED HEALTH CARE EDUCATION/TRAINING PROGRAM
Payer: COMMERCIAL

## 2024-11-05 DIAGNOSIS — Q21.0 VENTRICULAR SEPTAL DEFECT (VSD), MEMBRANOUS: ICD-10-CM

## 2024-11-05 DIAGNOSIS — R01.1 HEART MURMUR: ICD-10-CM

## 2024-11-05 LAB
LV EJECT FRACT  99904: 60
LV EJECT FRACT MOD 2C 99903: 64.82
LV EJECT FRACT MOD 4C 99902: 56.83
LV EJECT FRACT MOD BP 99901: 62.04

## 2024-11-05 PROCEDURE — 93306 TTE W/DOPPLER COMPLETE: CPT | Mod: 26 | Performed by: INTERNAL MEDICINE

## 2024-11-05 PROCEDURE — 93306 TTE W/DOPPLER COMPLETE: CPT

## 2024-11-11 ENCOUNTER — HOSPITAL ENCOUNTER (OUTPATIENT)
Dept: LAB | Facility: MEDICAL CENTER | Age: 59
End: 2024-11-11
Attending: STUDENT IN AN ORGANIZED HEALTH CARE EDUCATION/TRAINING PROGRAM
Payer: COMMERCIAL

## 2024-11-11 DIAGNOSIS — D75.89 MACROCYTOSIS: ICD-10-CM

## 2024-11-11 DIAGNOSIS — E55.9 VITAMIN D DEFICIENCY: ICD-10-CM

## 2024-11-11 DIAGNOSIS — E53.8 B12 DEFICIENCY: ICD-10-CM

## 2024-11-11 LAB
25(OH)D3 SERPL-MCNC: 30 NG/ML (ref 30–100)
BASOPHILS # BLD AUTO: 0.8 % (ref 0–1.8)
BASOPHILS # BLD: 0.06 K/UL (ref 0–0.12)
EOSINOPHIL # BLD AUTO: 0.06 K/UL (ref 0–0.51)
EOSINOPHIL NFR BLD: 0.8 % (ref 0–6.9)
ERYTHROCYTE [DISTWIDTH] IN BLOOD BY AUTOMATED COUNT: 48 FL (ref 35.9–50)
HCT VFR BLD AUTO: 46.4 % (ref 37–47)
HGB BLD-MCNC: 15.2 G/DL (ref 12–16)
IMM GRANULOCYTES # BLD AUTO: 0.02 K/UL (ref 0–0.11)
IMM GRANULOCYTES NFR BLD AUTO: 0.3 % (ref 0–0.9)
LYMPHOCYTES # BLD AUTO: 1.93 K/UL (ref 1–4.8)
LYMPHOCYTES NFR BLD: 25 % (ref 22–41)
MCH RBC QN AUTO: 32.6 PG (ref 27–33)
MCHC RBC AUTO-ENTMCNC: 32.8 G/DL (ref 32.2–35.5)
MCV RBC AUTO: 99.6 FL (ref 81.4–97.8)
MONOCYTES # BLD AUTO: 0.78 K/UL (ref 0–0.85)
MONOCYTES NFR BLD AUTO: 10.1 % (ref 0–13.4)
NEUTROPHILS # BLD AUTO: 4.88 K/UL (ref 1.82–7.42)
NEUTROPHILS NFR BLD: 63 % (ref 44–72)
NRBC # BLD AUTO: 0 K/UL
NRBC BLD-RTO: 0 /100 WBC (ref 0–0.2)
PLATELET # BLD AUTO: 305 K/UL (ref 164–446)
PMV BLD AUTO: 9.6 FL (ref 9–12.9)
RBC # BLD AUTO: 4.66 M/UL (ref 4.2–5.4)
VIT B12 SERPL-MCNC: 1075 PG/ML (ref 211–911)
WBC # BLD AUTO: 7.7 K/UL (ref 4.8–10.8)

## 2024-11-11 PROCEDURE — 86340 INTRINSIC FACTOR ANTIBODY: CPT

## 2024-11-11 PROCEDURE — 36415 COLL VENOUS BLD VENIPUNCTURE: CPT

## 2024-11-11 PROCEDURE — 82607 VITAMIN B-12: CPT

## 2024-11-11 PROCEDURE — 85025 COMPLETE CBC W/AUTO DIFF WBC: CPT

## 2024-11-11 PROCEDURE — 82306 VITAMIN D 25 HYDROXY: CPT

## 2024-11-15 LAB — IF BLOCK AB SER QL RIA: NEGATIVE

## 2024-11-19 ENCOUNTER — APPOINTMENT (OUTPATIENT)
Dept: MEDICAL GROUP | Facility: LAB | Age: 59
End: 2024-11-19
Payer: COMMERCIAL

## 2024-11-19 VITALS
DIASTOLIC BLOOD PRESSURE: 82 MMHG | OXYGEN SATURATION: 98 % | RESPIRATION RATE: 16 BRPM | HEIGHT: 63 IN | HEART RATE: 64 BPM | BODY MASS INDEX: 41.99 KG/M2 | WEIGHT: 237 LBS | TEMPERATURE: 97.9 F | SYSTOLIC BLOOD PRESSURE: 116 MMHG

## 2024-11-19 DIAGNOSIS — E53.8 B12 DEFICIENCY: ICD-10-CM

## 2024-11-19 DIAGNOSIS — G47.30 SLEEP APNEA, UNSPECIFIED TYPE: ICD-10-CM

## 2024-11-19 DIAGNOSIS — Z23 NEED FOR VACCINATION: ICD-10-CM

## 2024-11-19 DIAGNOSIS — Z12.11 ENCOUNTER FOR SCREENING FOR MALIGNANT NEOPLASM OF COLON: ICD-10-CM

## 2024-11-19 DIAGNOSIS — E55.9 VITAMIN D DEFICIENCY: ICD-10-CM

## 2024-11-19 DIAGNOSIS — I27.20 PULMONARY HYPERTENSION (HCC): ICD-10-CM

## 2024-11-19 DIAGNOSIS — Q21.0 VENTRICULAR SEPTAL DEFECT (VSD), MEMBRANOUS: ICD-10-CM

## 2024-11-19 PROBLEM — D75.89 MACROCYTOSIS: Status: RESOLVED | Noted: 2024-07-11 | Resolved: 2024-11-19

## 2024-11-19 PROCEDURE — 99214 OFFICE O/P EST MOD 30 MIN: CPT | Mod: 25 | Performed by: STUDENT IN AN ORGANIZED HEALTH CARE EDUCATION/TRAINING PROGRAM

## 2024-11-19 PROCEDURE — 90656 IIV3 VACC NO PRSV 0.5 ML IM: CPT | Performed by: STUDENT IN AN ORGANIZED HEALTH CARE EDUCATION/TRAINING PROGRAM

## 2024-11-19 PROCEDURE — 90471 IMMUNIZATION ADMIN: CPT | Performed by: STUDENT IN AN ORGANIZED HEALTH CARE EDUCATION/TRAINING PROGRAM

## 2024-11-19 PROCEDURE — 3074F SYST BP LT 130 MM HG: CPT | Performed by: STUDENT IN AN ORGANIZED HEALTH CARE EDUCATION/TRAINING PROGRAM

## 2024-11-19 PROCEDURE — 3079F DIAST BP 80-89 MM HG: CPT | Performed by: STUDENT IN AN ORGANIZED HEALTH CARE EDUCATION/TRAINING PROGRAM

## 2024-11-19 RX ORDER — CHOLECALCIFEROL (VITAMIN D3) 25 MCG
2 TABLET,CHEWABLE ORAL DAILY
COMMUNITY

## 2024-11-19 ASSESSMENT — ENCOUNTER SYMPTOMS
CHILLS: 0
COUGH: 0
FEVER: 0
VOMITING: 0
NAUSEA: 0
SHORTNESS OF BREATH: 0
DIARRHEA: 0
ABDOMINAL PAIN: 0
WEIGHT LOSS: 0

## 2024-11-19 ASSESSMENT — FIBROSIS 4 INDEX: FIB4 SCORE: .9

## 2024-11-19 NOTE — PROGRESS NOTES
"Verbal consent was acquired by the patient to use [employer] ambient listening note generation during this visit Yes.    Subjective:     Chief Complaint   Patient presents with    Lab Results    Results     Echo      HPI:     History of Present Illness  The patient is a 59-year-old female here to review results.    She has not consulted a cardiologist recently and has never been diagnosed with pulmonary hypertension. She reports no difficulty in breathing or leg swelling. Her blood pressure has consistently been within the normal range.    She has a history of sleep apnea, which was managed with a CPAP machine prior to her gastric bypass surgery. Post-surgery, she lost weight and discontinued the use of the CPAP machine. She has not been retested for sleep apnea since then. She reports no daytime fatigue and is not currently on any medication.    She is due for a colonoscopy in February 2025, needs a referral.    Review of Systems   Constitutional:  Negative for chills, fever, malaise/fatigue and weight loss.   Respiratory:  Negative for cough and shortness of breath.    Cardiovascular:  Negative for chest pain, palpitations, orthopnea, leg swelling and PND.   Gastrointestinal:  Negative for abdominal pain, diarrhea, nausea and vomiting.   Skin:  Negative for rash.   Neurological:  Negative for dizziness and headaches.       Objective:     Exam:  /82 (BP Location: Left arm, Patient Position: Sitting, BP Cuff Size: Large adult long)   Pulse 64   Temp 36.6 °C (97.9 °F) (Temporal)   Resp 16   Ht 1.6 m (5' 3\")   Wt 108 kg (237 lb)   SpO2 98%   BMI 41.98 kg/m²  Body mass index is 41.98 kg/m².    Physical Exam  Vitals reviewed.   Constitutional:       General: She is not in acute distress.     Appearance: Normal appearance. She is morbidly obese. She is not ill-appearing.   HENT:      Head: Normocephalic and atraumatic.      Nose: Nose normal.      Mouth/Throat:      Mouth: Mucous membranes are moist. "   Eyes:      Conjunctiva/sclera: Conjunctivae normal.   Cardiovascular:      Rate and Rhythm: Normal rate and regular rhythm.      Heart sounds: Murmur heard.      Systolic murmur is present with a grade of 3/6.   Pulmonary:      Effort: Pulmonary effort is normal. No respiratory distress.      Breath sounds: Normal breath sounds. No stridor. No wheezing, rhonchi or rales.   Musculoskeletal:      Cervical back: Normal range of motion and neck supple. No rigidity or tenderness.      Right lower leg: Edema present.      Left lower leg: Edema present.      Comments: trace pitting above sock line bilaterally   Skin:     General: Skin is warm and dry.   Neurological:      General: No focal deficit present.      Mental Status: She is alert and oriented to person, place, and time.   Psychiatric:         Mood and Affect: Mood normal.         Behavior: Behavior normal.         Thought Content: Thought content normal.       Labs: Reviewed from 11/11/2024  Imaging: Reviewed from 11/5/2024    STOPBANClipcopia - Sleep Apnea Screening      Flowsheet Row Most Recent Value   S - Have you been told that you SNORE? No   T - Are you often TIRED during the day? No   O - Do you know if you stop breathing or has anyone witnessed you stop breathing while you were asleep? (OBSTRUCTION) No   P - Do you have high blood PRESSURE or on medication to control high blood pressure? No   B - Is your Body Mass Index greater than 35? (BMI) Yes   A - Are you 50 years old or older? (AGE) Yes   N - Are you a male with a NECK circumference greater than 17 inches, or a female with a neck circumference greater than 16 inches? No   G - Are you male? (GENDER) No   STOPBANG Total Score 2   RADHA Risk Low Risk          Assessment & Plan:     59 y.o. female with the following -     1. Pulmonary hypertension (HCC)  2. Ventricular septal defect (VSD), membranous  Chronic VSD with new pulmonary hypertension.  Asymptomatic.  Patient to establish with cardiology, evaluate for  sleep apnea.  Gave return precautions.  - REFERRAL TO CARDIOLOGY  - Overnight Home Sleep Study; Future  - Comp Metabolic Panel; Future    3. Sleep apnea, unspecified type  Chronic prior diagnosis without current symptoms but not confirmed resolved after weight loss following gastric bypass.  Given pulmonary hypertension we will have her repeat her sleep study to confirm resolution.  - Overnight Home Sleep Study; Future    4. Vitamin D deficiency  Resolved with supplementation, trend annually.  - VITAMIN D,25 HYDROXY (DEFICIENCY); Future    5. B12 deficiency  Resolved with B12 supplementation.  No intrinsic factor antibody, suspect due to history of gastric bypass.  Continue 1000 mcg daily and evaluate for nutritional deficiencies annually.  - CBC WITH DIFFERENTIAL; Future  - FERRITIN; Future  - FOLATE; Future  - IRON/TOTAL IRON BIND; Future  - VITAMIN B12; Future    6. Encounter for screening for malignant neoplasm of colon  - Referral to GI for Colonoscopy    7. Need for vaccination  - INFLUENZA VACCINE TRI INJ (PF)    Return in about 8 months (around 7/11/2025), or if symptoms worsen or fail to improve, for Annual.    Please note that this dictation was created using voice recognition software. I have made every reasonable attempt to correct obvious errors, but I expect that there are errors of grammar and possibly content that I did not discover before finalizing the note.

## 2024-11-20 ASSESSMENT — ENCOUNTER SYMPTOMS
PND: 0
ORTHOPNEA: 0
DIZZINESS: 0
PALPITATIONS: 0
HEADACHES: 0

## 2024-12-12 ENCOUNTER — TELEPHONE (OUTPATIENT)
Dept: HEALTH INFORMATION MANAGEMENT | Facility: OTHER | Age: 59
End: 2024-12-12
Payer: COMMERCIAL

## 2025-01-16 ENCOUNTER — TELEPHONE (OUTPATIENT)
Dept: CARDIOLOGY | Facility: MEDICAL CENTER | Age: 60
End: 2025-01-16
Payer: COMMERCIAL

## 2025-01-16 NOTE — TELEPHONE ENCOUNTER
Called pt regard upcoming appt, to see if she have any testing in the past 6 months outside of [facility] like EKG, CT, Echo unable to answer LVM

## 2025-01-16 NOTE — TELEPHONE ENCOUNTER
Caller: Zoya Urban     Topic/issue: Patient is returning chart prep call and she has had no imagining or labs done outside of [facility]     Callback Number: [phone]    Thank You   Eli DALE

## 2025-01-22 ENCOUNTER — PATIENT MESSAGE (OUTPATIENT)
Dept: MEDICAL GROUP | Facility: LAB | Age: 60
End: 2025-01-22
Payer: COMMERCIAL

## 2025-01-22 DIAGNOSIS — G47.30 SLEEP APNEA, UNSPECIFIED TYPE: ICD-10-CM

## 2025-01-24 ENCOUNTER — OFFICE VISIT (OUTPATIENT)
Dept: CARDIOLOGY | Facility: MEDICAL CENTER | Age: 60
End: 2025-01-24
Attending: STUDENT IN AN ORGANIZED HEALTH CARE EDUCATION/TRAINING PROGRAM
Payer: COMMERCIAL

## 2025-01-24 VITALS
OXYGEN SATURATION: 95 % | DIASTOLIC BLOOD PRESSURE: 70 MMHG | WEIGHT: 236 LBS | RESPIRATION RATE: 16 BRPM | BODY MASS INDEX: 41.82 KG/M2 | HEIGHT: 63 IN | HEART RATE: 73 BPM | SYSTOLIC BLOOD PRESSURE: 130 MMHG

## 2025-01-24 DIAGNOSIS — G47.30 SLEEP APNEA, UNSPECIFIED TYPE: ICD-10-CM

## 2025-01-24 DIAGNOSIS — I27.20 PULMONARY HYPERTENSION (HCC): ICD-10-CM

## 2025-01-24 DIAGNOSIS — E66.01 SEVERE OBESITY (HCC): ICD-10-CM

## 2025-01-24 DIAGNOSIS — Q21.0 VENTRICULAR SEPTAL DEFECT (VSD), MEMBRANOUS: ICD-10-CM

## 2025-01-24 DIAGNOSIS — R01.1 HEART MURMUR: ICD-10-CM

## 2025-01-24 PROCEDURE — 99211 OFF/OP EST MAY X REQ PHY/QHP: CPT | Performed by: INTERNAL MEDICINE

## 2025-01-24 PROCEDURE — 3078F DIAST BP <80 MM HG: CPT | Performed by: INTERNAL MEDICINE

## 2025-01-24 PROCEDURE — 3075F SYST BP GE 130 - 139MM HG: CPT | Performed by: INTERNAL MEDICINE

## 2025-01-24 PROCEDURE — 93005 ELECTROCARDIOGRAM TRACING: CPT | Mod: TC | Performed by: INTERNAL MEDICINE

## 2025-01-24 PROCEDURE — 99204 OFFICE O/P NEW MOD 45 MIN: CPT | Performed by: INTERNAL MEDICINE

## 2025-01-24 ASSESSMENT — FIBROSIS 4 INDEX: FIB4 SCORE: .9

## 2025-01-25 NOTE — PATIENT INSTRUCTIONS
Follow up in 12 months  Check an echocardiogram  Home sleep study to be reviewed when completed by Dr. Yanes  Continue current medications  Call with questions.

## 2025-01-25 NOTE — ASSESSMENT & PLAN NOTE
Regarding her pulmonary hypertension she is not having any adverse dyspneic complaints and able to do her desired activities.  At this time we will continue to monitor clinically

## 2025-01-25 NOTE — PROGRESS NOTES
[facility]    PatientName:Zoya UrbanDate: 2025  :1965    59 y.o.PCP:Marleny Yanes D.O.  MRN:[id number]          Problems and Plans    Ventricular septal defect (VSD), membranous  Clinically, she is doing well and at this time has a noted membranous ventricular septal defect with a left to right shunt.  At this time she is not seeing any adverse dilatation of the right ventricle but has developed noninvasive determined elevation in her pulmonary pressures estimated at 40-45 mmHg.  I suspect that this is a congenital abnormality and the development of likely mild pulmonary hypertension is multifactorial.  I have recommended that she undergo a sleep study and would not recommend surgical intervention at this time for her VSD.  We will continue to monitor with annual echocardiogram and check a home sleep study    Sleep apnea  Noted prior history of sleep apnea but no recent workup to reassess given weight loss with gastric bypass surgery/bariatric surgery.  We will repeat a home sleep study given that her STOP-BANG score is 1 however I suspect that she may be experiencing sleep apnea.    Pulmonary hypertension (HCC)  Regarding her pulmonary hypertension she is not having any adverse dyspneic complaints and able to do her desired activities.  At this time we will continue to monitor clinically    Heart murmur  Regarding her heart murmur this is related to her VSD    Return in about 1 year (around 2026).      Encounter    Reason for Visit / Chief Complaint: Ventricular septal defect    HPI    59-year-old female with noted history of longstanding membranous ventricular septal defect, obesity, vitamin D deficiency, prior diagnosis of sleep apnea, prior gastric bypass surgery presents in clinic for ongoing management of her VSD.    Currently, she notes she has been feeling well and not having significant cardiovascular complaints.  She does note occasional  bruising without inciting trauma.  She has been taking vitamin E and her multivitamin.    Echocardiogram performed on 11/5/2024 demonstrates a small membranous VSD with left-to-right shunting 0.5 cm.  Normal LV cavity size with preserved LV systolic function estimate ejection fraction of 60% with normal RV size and normal systolic function, mild tricuspid valve regurgitation with estimated RV systolic pressure of 40 to 45 mmHg and estimated dilated main pulmonary artery at 4.0 cm.  No significant valvular disease    Prior cardiogram in March 2019 demonstrated preserved LV systolic function with estimate ejection fraction of 65% with evidence of a ventricular septal defect in the membranous portion of the septum mildly dilated left atrium, trace tricuspid valve regurgitation with estimated RV systolic pressure of 30 mmHg  Past Medical History  Past Medical History:   Diagnosis Date    Allergy     Heart murmur     Hyperlipidemia     Macrocytosis 07/11/2024     Past Surgical History  Past Surgical History:   Procedure Laterality Date    ABDOMINAL EXPLORATION      GASTRIC BYPASS LAPAROSCOPIC       Social History  Social History     Socioeconomic History    Marital status:      Spouse name: Not on file    Number of children: 0    Years of education: Not on file    Highest education level: Not on file   Occupational History    Not on file   Tobacco Use    Smoking status: Never    Smokeless tobacco: Never   Vaping Use    Vaping status: Never Used   Substance and Sexual Activity    Alcohol use: Yes     Alcohol/week: 4.2 oz     Types: 7 Glasses of wine per week     Comment: 1 glass of wine daily    Drug use: Never    Sexual activity: Not Currently   Other Topics Concern    Not on file   Social History Narrative    Not on file     Social Drivers of Health     Financial Resource Strain: Not on file   Food Insecurity: Not on file   Transportation Needs: Not on file   Physical Activity: Not on file   Stress: Not on file  "  Social Connections: Not on file   Intimate Partner Violence: Not on file   Housing Stability: Not on file     Past Family History  Family History   Problem Relation Age of Onset    Cancer Mother         GI-but not colon    Prostate cancer Father     Lung Disease Father         smoker, COPD    Cancer Sister         anal    Skin cancer Brother         non melanoma    Skin cancer Brother         non melanoma    Breast Cancer Maternal Aunt     Colon Cancer Neg Hx     Ovarian Cancer Neg Hx      Medication(s)    Current Outpatient Medications:     multivitamin, 1 Tablet, Oral, DAILY, Taking    Vitamin D3 Extra Strength, 2 Tablet, Oral, DAILY, Taking    cyanocobalamin, 1,000 mcg, Oral, DAILY, Taking  Allergies  Cipro xr    Review of Systems    A comprehensive 10 system review was conducted and is negative except as noted above in the HPI or here.      Vital Signs  /70 (BP Location: Left arm, Patient Position: Sitting, BP Cuff Size: Adult)   Pulse 73   Resp 16   Ht 1.6 m (5' 3\")   Wt 107 kg (236 lb)   SpO2 95%   BMI 41.81 kg/m²     Physical Exam  Vitals and nursing note reviewed.   Constitutional:       Appearance: Normal appearance. She is obese.   HENT:      Head: Normocephalic and atraumatic.      Nose: Nose normal.      Mouth/Throat:      Mouth: Mucous membranes are moist.      Pharynx: Oropharynx is clear.   Eyes:      Pupils: Pupils are equal, round, and reactive to light.   Cardiovascular:      Rate and Rhythm: Normal rate and regular rhythm.      Heart sounds: Murmur (3 out of 6 crescendo decrescendo murmur at the left upper sternal border radiating throughout the precordium) heard.      No friction rub. No gallop.   Pulmonary:      Effort: Pulmonary effort is normal.      Breath sounds: Normal breath sounds.   Abdominal:      General: Bowel sounds are normal.      Palpations: Abdomen is soft.   Musculoskeletal:         General: Normal range of motion.      Cervical back: Normal range of motion and " "neck supple.   Skin:     General: Skin is warm and dry.   Neurological:      General: No focal deficit present.      Mental Status: She is alert and oriented to person, place, and time. Mental status is at baseline.   Psychiatric:         Mood and Affect: Mood normal.         Behavior: Behavior normal.         Thought Content: Thought content normal.         Judgment: Judgment normal.         Lab Results   Component Value Date/Time    TSHULTRASEN 2.140 06/15/2024 1131      No results found for: \"FREET4\"     Lab Results   Component Value Date/Time    HBA1C 5.6 12/04/2012 10:38 AM       Lab Results   Component Value Date/Time    CHOLSTRLTOT 172 06/15/2024 11:31 AM     (H) 06/15/2024 11:31 AM    HDL 52 06/15/2024 11:31 AM    TRIGLYCERIDE 75 06/15/2024 11:31 AM         Lab Results   Component Value Date/Time    SODIUM 140 06/15/2024 11:31 AM    POTASSIUM 4.5 06/15/2024 11:31 AM    CHLORIDE 105 06/15/2024 11:31 AM    CO2 24 06/15/2024 11:31 AM    GLUCOSE 89 06/15/2024 11:31 AM    BUN 12 06/15/2024 11:31 AM    CREATININE 0.68 06/15/2024 11:31 AM    CREATININE 0.7 07/12/2005 11:40 AM       Lab Results   Component Value Date/Time    ALKPHOSPHAT 85 06/15/2024 11:31 AM    ASTSGOT 18 06/15/2024 11:31 AM    ALTSGPT 16 06/15/2024 11:31 AM    TBILIRUBIN 1.0 06/15/2024 11:31 AM         Imaging  EKG demonstrates normal sinus rhythm with normal intervals.  No evidence of right ventricular hypertrophy or right atrial enlargement (P pulmonale)    Echocardiogram  Pending    Total patient time was estimated to be 45 minutes consisting of chart review, direct patient interaction, medication renewal, plan development and overall communication with the cardiovascular team.        Electronically signed by:   Randell Simmons DO, MPH  [facility]    Portions of this note were completed using voice recognition software (Dragon Naturally speaking software) . Occasional transcription errors may have " escaped proof reading. I have made every reasonable attempt to correct obvious errors, but I expect that there are errors of grammar and possibly content that I did not discover before finalizing the note.

## 2025-01-25 NOTE — ASSESSMENT & PLAN NOTE
Noted prior history of sleep apnea but no recent workup to reassess given weight loss with gastric bypass surgery/bariatric surgery.  We will repeat a home sleep study given that her STOP-BANG score is 1 however I suspect that she may be experiencing sleep apnea.

## 2025-01-25 NOTE — ASSESSMENT & PLAN NOTE
Clinically, she is doing well and at this time has a noted membranous ventricular septal defect with a left to right shunt.  At this time she is not seeing any adverse dilatation of the right ventricle but has developed noninvasive determined elevation in her pulmonary pressures estimated at 40-45 mmHg.  I suspect that this is a congenital abnormality and the development of likely mild pulmonary hypertension is multifactorial.  I have recommended that she undergo a sleep study and would not recommend surgical intervention at this time for her VSD.  We will continue to monitor with annual echocardiogram and check a home sleep study

## 2025-01-27 LAB — EKG IMPRESSION: NORMAL

## 2025-01-27 PROCEDURE — 93010 ELECTROCARDIOGRAM REPORT: CPT | Performed by: INTERNAL MEDICINE

## 2025-01-29 NOTE — TELEPHONE ENCOUNTER
Patient call she has had no imagining or labs done outside of [facility]

## 2025-02-11 ENCOUNTER — APPOINTMENT (OUTPATIENT)
Dept: SLEEP MEDICINE | Facility: MEDICAL CENTER | Age: 60
End: 2025-02-11
Attending: INTERNAL MEDICINE
Payer: COMMERCIAL

## 2025-02-11 DIAGNOSIS — I27.20 PULMONARY HYPERTENSION (HCC): ICD-10-CM

## 2025-02-11 PROCEDURE — 95800 SLP STDY UNATTENDED: CPT | Performed by: STUDENT IN AN ORGANIZED HEALTH CARE EDUCATION/TRAINING PROGRAM

## 2025-02-19 NOTE — PROCEDURES
DIAGNOSTIC HOME SLEEP TEST (HST) REPORT WatchPAT      PATIENT ID:  NAME:  Zoya Urban  MRN:               [id number]  YOB: 1965  DATE OF STUDY: 02/11/2025      Impression:     This study shows evidence of:      1. Severe obstructive sleep apnea with PAT apnea hypopnea index(pAHI) of 38.2 per hour.  PAT respiratory disturbance index (pRDI) was 40.2 per hour. These findings are based on 7 channels recording of PAT signal with sleep staging, heart rate, pulse oximetry, actigraphy, body position, snoring and respiratory movement.     2. Oxygenation O2 Sat. mean O2 sat was 85%,  quita was 51%,  and maximum O2 at 95 %. O2 sat was at or  below 88% for 230 min of evaluation time. Oxygen Desaturation (>=4%) Index was 26.4/hr. AVG HR was 67 BPM.      TECHNICAL DESCRIPTION: Patient underwent home sleep apnea testing with peripheral arterial tone signal (WatchPAT™). This is a Type IV portable monitor and device per Medicare. Monitoring was done with 7 channels recording of PAT signal with sleep staging, heart rate, pulse oximetry, actigraphy, body position, snoring and respiratory movement. Prior to using the device, the patient received verbal and written instructions for its application and was provided with the help desk phone number for additional telephonic instruction with 24-hour availability of qualified personnel to answer questions.    Respiratory events:        General sleep summary: . Total recording time is 8 hours and 20 minutes and total Sleep time is 7 hours and 42 minutes. The patient spent 340 minutes in the supine position and 123 minutes in the nonsupine position.    Clustering of respiratory events seen during study    Recommendations:    1.Given severity of obstructive sleep apnea and nocturnal hypoxia, patient would benefit from undergoing in lab polysomnography CPAP/BiPAP titration study.  There is the potential patient may require more advanced modes of PAP therapy or  supplemental oxygen with PAP therapy which is best assessed in the sleep lab. If starting auto CPAP therapy would recommend minimum pressure 5 cmH2O maximum pressure 15 cmH2O.    2. In general patients with sleep apnea are advised to avoid alcohol and sedatives and to not operate a motor vehicle while drowsy. In some cases alternative treatment options may prove effective in resolving sleep apnea in these options include upper airway surgery, the use of a dental orthotic or weight loss and positional therapy. Clinical correlation is required.

## 2025-02-25 ENCOUNTER — RESULTS FOLLOW-UP (OUTPATIENT)
Dept: CARDIOLOGY | Facility: MEDICAL CENTER | Age: 60
End: 2025-02-25
Payer: COMMERCIAL

## 2025-03-11 ENCOUNTER — RESULTS FOLLOW-UP (OUTPATIENT)
Dept: MEDICAL GROUP | Facility: LAB | Age: 60
End: 2025-03-11
Payer: COMMERCIAL

## 2025-03-11 ENCOUNTER — HOSPITAL ENCOUNTER (OUTPATIENT)
Dept: RADIOLOGY | Facility: MEDICAL CENTER | Age: 60
End: 2025-03-11
Attending: STUDENT IN AN ORGANIZED HEALTH CARE EDUCATION/TRAINING PROGRAM
Payer: COMMERCIAL

## 2025-03-11 DIAGNOSIS — Z12.31 VISIT FOR SCREENING MAMMOGRAM: ICD-10-CM

## 2025-03-11 PROCEDURE — 77067 SCR MAMMO BI INCL CAD: CPT

## 2025-07-12 ENCOUNTER — APPOINTMENT (OUTPATIENT)
Dept: LAB | Facility: MEDICAL CENTER | Age: 60
End: 2025-07-12
Payer: COMMERCIAL

## 2025-07-12 DIAGNOSIS — E55.9 VITAMIN D DEFICIENCY: ICD-10-CM

## 2025-07-12 DIAGNOSIS — E53.8 B12 DEFICIENCY: ICD-10-CM

## 2025-07-12 DIAGNOSIS — I27.20 PULMONARY HYPERTENSION (HCC): ICD-10-CM

## 2025-07-12 LAB
25(OH)D3 SERPL-MCNC: 48 NG/ML (ref 30–100)
ALBUMIN SERPL BCP-MCNC: 3.5 G/DL (ref 3.2–4.9)
ALBUMIN/GLOB SERPL: 1.1 G/DL
ALP SERPL-CCNC: 83 U/L (ref 30–99)
ALT SERPL-CCNC: 15 U/L (ref 2–50)
ANION GAP SERPL CALC-SCNC: 10 MMOL/L (ref 7–16)
AST SERPL-CCNC: 25 U/L (ref 12–45)
BASOPHILS # BLD AUTO: 0.9 % (ref 0–1.8)
BASOPHILS # BLD: 0.05 K/UL (ref 0–0.12)
BILIRUB SERPL-MCNC: 0.5 MG/DL (ref 0.1–1.5)
BUN SERPL-MCNC: 12 MG/DL (ref 8–22)
CALCIUM ALBUM COR SERPL-MCNC: 9.4 MG/DL (ref 8.5–10.5)
CALCIUM SERPL-MCNC: 9 MG/DL (ref 8.5–10.5)
CHLORIDE SERPL-SCNC: 105 MMOL/L (ref 96–112)
CO2 SERPL-SCNC: 27 MMOL/L (ref 20–33)
CREAT SERPL-MCNC: 0.78 MG/DL (ref 0.5–1.4)
EOSINOPHIL # BLD AUTO: 0.08 K/UL (ref 0–0.51)
EOSINOPHIL NFR BLD: 1.4 % (ref 0–6.9)
ERYTHROCYTE [DISTWIDTH] IN BLOOD BY AUTOMATED COUNT: 52.1 FL (ref 35.9–50)
FERRITIN SERPL-MCNC: 50.1 NG/ML (ref 10–291)
FOLATE SERPL-MCNC: 20.1 NG/ML
GFR SERPLBLD CREATININE-BSD FMLA CKD-EPI: 86 ML/MIN/1.73 M 2
GLOBULIN SER CALC-MCNC: 3.3 G/DL (ref 1.9–3.5)
GLUCOSE SERPL-MCNC: 86 MG/DL (ref 65–99)
HCT VFR BLD AUTO: 47.8 % (ref 37–47)
HGB BLD-MCNC: 15 G/DL (ref 12–16)
IMM GRANULOCYTES # BLD AUTO: 0.01 K/UL (ref 0–0.11)
IMM GRANULOCYTES NFR BLD AUTO: 0.2 % (ref 0–0.9)
IRON SATN MFR SERPL: 31 % (ref 15–55)
IRON SERPL-MCNC: 99 UG/DL (ref 40–170)
LYMPHOCYTES # BLD AUTO: 1.74 K/UL (ref 1–4.8)
LYMPHOCYTES NFR BLD: 31.5 % (ref 22–41)
MCH RBC QN AUTO: 31.6 PG (ref 27–33)
MCHC RBC AUTO-ENTMCNC: 31.4 G/DL (ref 32.2–35.5)
MCV RBC AUTO: 100.6 FL (ref 81.4–97.8)
MONOCYTES # BLD AUTO: 0.6 K/UL (ref 0–0.85)
MONOCYTES NFR BLD AUTO: 10.8 % (ref 0–13.4)
NEUTROPHILS # BLD AUTO: 3.05 K/UL (ref 1.82–7.42)
NEUTROPHILS NFR BLD: 55.2 % (ref 44–72)
NRBC # BLD AUTO: 0 K/UL
NRBC BLD-RTO: 0 /100 WBC (ref 0–0.2)
PLATELET # BLD AUTO: 273 K/UL (ref 164–446)
PMV BLD AUTO: 10.6 FL (ref 9–12.9)
POTASSIUM SERPL-SCNC: 4.9 MMOL/L (ref 3.6–5.5)
PROT SERPL-MCNC: 6.8 G/DL (ref 6–8.2)
RBC # BLD AUTO: 4.75 M/UL (ref 4.2–5.4)
SODIUM SERPL-SCNC: 142 MMOL/L (ref 135–145)
TIBC SERPL-MCNC: 318 UG/DL (ref 250–450)
UIBC SERPL-MCNC: 219 UG/DL (ref 110–370)
VIT B12 SERPL-MCNC: 3376 PG/ML (ref 211–911)
WBC # BLD AUTO: 5.5 K/UL (ref 4.8–10.8)

## 2025-07-12 PROCEDURE — 83550 IRON BINDING TEST: CPT

## 2025-07-12 PROCEDURE — 83540 ASSAY OF IRON: CPT

## 2025-07-12 PROCEDURE — 36415 COLL VENOUS BLD VENIPUNCTURE: CPT

## 2025-07-12 PROCEDURE — 82746 ASSAY OF FOLIC ACID SERUM: CPT

## 2025-07-12 PROCEDURE — 82607 VITAMIN B-12: CPT

## 2025-07-12 PROCEDURE — 82306 VITAMIN D 25 HYDROXY: CPT

## 2025-07-12 PROCEDURE — 80053 COMPREHEN METABOLIC PANEL: CPT

## 2025-07-12 PROCEDURE — 85025 COMPLETE CBC W/AUTO DIFF WBC: CPT

## 2025-07-12 PROCEDURE — 82728 ASSAY OF FERRITIN: CPT

## 2025-07-15 ENCOUNTER — APPOINTMENT (OUTPATIENT)
Dept: MEDICAL GROUP | Facility: LAB | Age: 60
End: 2025-07-15
Payer: COMMERCIAL

## 2025-07-15 VITALS
HEIGHT: 63 IN | SYSTOLIC BLOOD PRESSURE: 122 MMHG | OXYGEN SATURATION: 94 % | RESPIRATION RATE: 16 BRPM | BODY MASS INDEX: 42.72 KG/M2 | TEMPERATURE: 97.9 F | WEIGHT: 241.1 LBS | DIASTOLIC BLOOD PRESSURE: 76 MMHG | HEART RATE: 79 BPM

## 2025-07-15 DIAGNOSIS — H61.21 IMPACTED CERUMEN OF RIGHT EAR: ICD-10-CM

## 2025-07-15 DIAGNOSIS — D75.89 MACROCYTOSIS WITHOUT ANEMIA: ICD-10-CM

## 2025-07-15 DIAGNOSIS — H61.22 EXCESSIVE CERUMEN IN LEFT EAR CANAL: ICD-10-CM

## 2025-07-15 DIAGNOSIS — R79.89 ELEVATED VITAMIN B12 LEVEL: ICD-10-CM

## 2025-07-15 DIAGNOSIS — E78.00 ELEVATED LDL CHOLESTEROL LEVEL: ICD-10-CM

## 2025-07-15 DIAGNOSIS — Z00.00 ENCOUNTER FOR ANNUAL GENERAL MEDICAL EXAMINATION WITHOUT ABNORMAL FINDINGS IN ADULT: Primary | ICD-10-CM

## 2025-07-15 DIAGNOSIS — I27.20 PULMONARY HYPERTENSION (HCC): ICD-10-CM

## 2025-07-15 DIAGNOSIS — G47.33 SEVERE OBSTRUCTIVE SLEEP APNEA: ICD-10-CM

## 2025-07-15 DIAGNOSIS — G47.34 NOCTURNAL HYPOXIA: ICD-10-CM

## 2025-07-15 DIAGNOSIS — E66.813 CLASS 3 SEVERE OBESITY WITH SERIOUS COMORBIDITY AND BODY MASS INDEX (BMI) OF 40.0 TO 44.9 IN ADULT: ICD-10-CM

## 2025-07-15 PROCEDURE — 3078F DIAST BP <80 MM HG: CPT | Performed by: STUDENT IN AN ORGANIZED HEALTH CARE EDUCATION/TRAINING PROGRAM

## 2025-07-15 PROCEDURE — 69210 REMOVE IMPACTED EAR WAX UNI: CPT | Performed by: STUDENT IN AN ORGANIZED HEALTH CARE EDUCATION/TRAINING PROGRAM

## 2025-07-15 PROCEDURE — 99214 OFFICE O/P EST MOD 30 MIN: CPT | Mod: 25 | Performed by: STUDENT IN AN ORGANIZED HEALTH CARE EDUCATION/TRAINING PROGRAM

## 2025-07-15 PROCEDURE — 3074F SYST BP LT 130 MM HG: CPT | Performed by: STUDENT IN AN ORGANIZED HEALTH CARE EDUCATION/TRAINING PROGRAM

## 2025-07-15 PROCEDURE — 99396 PREV VISIT EST AGE 40-64: CPT | Mod: 25 | Performed by: STUDENT IN AN ORGANIZED HEALTH CARE EDUCATION/TRAINING PROGRAM

## 2025-07-15 ASSESSMENT — ENCOUNTER SYMPTOMS
FEVER: 0
SHORTNESS OF BREATH: 0
VOMITING: 0
COUGH: 0
CHILLS: 0
ABDOMINAL PAIN: 0
NAUSEA: 0
WEIGHT LOSS: 0
DIARRHEA: 0

## 2025-07-15 ASSESSMENT — PATIENT HEALTH QUESTIONNAIRE - PHQ9: CLINICAL INTERPRETATION OF PHQ2 SCORE: 0

## 2025-07-15 ASSESSMENT — FIBROSIS 4 INDEX: FIB4 SCORE: 1.47

## 2025-07-15 NOTE — PATIENT INSTRUCTIONS
Debrox (for ear wax)  Sunscreen (30+) daily!    Aim for goal of 150 minutes a week of moderate intensity exercise (ex 30 minutes 5 times a week)    Vaccines due that can be received only at pharmacy:  COVID  Shingrix series (shingles)  RSV (respiratory syncytial virus)    Pulmonary/[place] [facility] [place]  [address], [address]  [place] [address]  Phone: [phone]    Reduce B12 by half    [email or web address]  Zepound (Tirzepatide)  Initial: 2.5 mg once weekly for 4 weeks, then increase to 5 mg once weekly. May further increase dose in 2.5 mg/week increments every 4 weeks, if needed (maximum weekly dose: 15 mg/week).  Note: The lower initial dose (2.5 mg weekly) is intended to reduce GI symptoms; it does not provide effective weight loss. If changing the day of administration is necessary, allow at least 72 hours between 2 doses.     If not covered, then we can try Wegovy (??m?gl?tide) instead  Week 1 through week 4 : 0.25 mg once weekly.  Week 5 through week 8: 0.5 mg once weekly.  Week 9 through week 12: 1 mg once weekly.  Week 13 through week 16: 1.7 mg once weekly.  Week 17 and thereafter (maintenance dosage): 2.4 mg once weekly (preferred regimen)  If not tolerated, alternative maintenance dosage of 1.7 mg once weekly.

## 2025-07-15 NOTE — PROGRESS NOTES
Subjective:     Chief Complaint   Patient presents with    Annual Exam       HPI:   Zoya Urban is a 60 y.o. female who presents for annual exam.     Verbal consent was acquired by the patient to use [employer] ambient listening note generation during this visit: YES    History of Present Illness  The patient is a 60-year-old female who presents for her annual exam.    She has been experiencing hearing issues for some time, which she attributes to earwax buildup. It has been a while since her last visit to an ENT specialist for ear cleaning. She reports a buzzing sound in her ears but can hear from both ears. She does not use Q-tips for ear cleaning and reports no pain. She had her hearing tested a few years ago and was advised to have her ears cleaned. She has previously undergone ear irrigation.    She underwent a home sleep study, the results of which were not communicated to her. She received an email suggesting another study at a facility, but she did not follow up on this. She has not scheduled an appointment with a sleep specialist. She reports no daytime fatigue, attributing her late bedtime to her work schedule. She expresses interest in treatment but prefers to wait until after the summer due to work commitments.    She maintains good oral hygiene, brushing and flossing regularly. She reports no leg swelling or abdominal issues. She also reports no vaginal bleeding or urinary incontinence. She is not sexually active. She recently had a Pap smear and mammogram. Her colon cancer screening is current. She reports no family history of osteoporosis. She acknowledges that her diet could be improved. She does not frequently expose herself to the sun and uses sunscreen when she does. She has a family history of skin cancer.    She met the cardiologist and he scheduled her for another echocardiogram in 11/2025. She is not experiencing any chest pain or trouble breathing.    Social History:  Diet: She  acknowledges that her diet could be improved.  Alcohol: She consumes a glass of wine at night, generally not excessive.  Tobacco: She does not smoke cigarettes.  Sleep: She stays up too late, usually not tired throughout the day.  Sexual Practices: She is not sexually active.    FAMILY HISTORY  She has a family history of skin cancer.    Ob-Gyn/ History:    Patient has GYN provider: no  /Para: G0  History of abnormal pap smears: no    Sexually active: no. Number of partners in the past year: 0   Post-menopausal bleeding: no  No significant pelvic pain or abnormal vaginal discharge.  Urinary incontinence: no    Health Maintenance  Osteoporosis Screen/ DEXA: Due at 63  Diet/exercise: Reviewed.  Cholesterol Screening:   Lab Results   Component Value Date     (H) 06/15/2024   Diabetes Screenin2025  Aspirin Use: Not indicated The 10-year ASCVD risk score (Rock MOTA, et al., 2019) is: 3.5%    Cancer screening  Colorectal Cancer Screening: Up to date.   Lung Cancer Screening: Not indicated  Cervical Cancer Screening: Up to date  Breast Cancer Screening: Up to date.    Infectious disease screening/Immunizations  --Immunizations: Reviewed with patient.   --Hepatitis C Screening: Complete/negative prior  --HIV Screening: Complete/negative prior. Risk factors: no    She  has a past medical history of Allergy, Heart murmur, Hyperlipidemia, Macrocytosis (2024), and Pacemaker (Duc ).    She has no past medical history of Breast cancer (HCC), Cancer (HCC), or Hypertension.  She  has a past surgical history that includes abdominal exploration and gastric bypass laparoscopic.    Family History   Problem Relation Age of Onset    Cancer Mother         GI-but not colon    Prostate cancer Father     Lung Disease Father         smoker, COPD    Cancer Father     Cancer Sister         anal    Skin cancer Brother         non melanoma    Skin cancer Brother         non melanoma    Breast Cancer Maternal Aunt      Cancer Maternal Aunt     Cancer Sister     Seizures Other     Colon Cancer Neg Hx     Ovarian Cancer Neg Hx        Social History     Socioeconomic History    Marital status:      Spouse name: Not on file    Number of children: 0    Years of education: Not on file    Highest education level: Not on file   Occupational History    Not on file   Tobacco Use    Smoking status: Never    Smokeless tobacco: Never   Vaping Use    Vaping status: Never Used   Substance and Sexual Activity    Alcohol use: Yes     Alcohol/week: 4.2 oz     Types: 7 Glasses of wine per week     Comment: 1 glass of wine daily    Drug use: Never    Sexual activity: Not Currently   Other Topics Concern    Not on file   Social History Narrative    Not on file     Social Drivers of Health     Financial Resource Strain: Not on file   Food Insecurity: Not on file   Transportation Needs: Not on file   Physical Activity: Not on file   Stress: Not on file   Social Connections: Not on file   Intimate Partner Violence: Not on file   Housing Stability: Not on file       Patient Active Problem List    Diagnosis Date Noted    Nocturnal hypoxia 07/15/2025    Class 3 severe obesity with serious comorbidity and body mass index (BMI) of 40.0 to 44.9 in adult 07/15/2025    Pulmonary hypertension (HCC) 11/19/2024    Severe obstructive sleep apnea 11/19/2024    B12 deficiency 07/11/2024    Macrocytosis without anemia 07/11/2024    Vitamin D deficiency 07/11/2024    Subjective hearing loss 07/11/2024    Morbid obesity (HCC) 04/30/2024    Elevated LDL cholesterol level 04/30/2024    Restrictive lung disease 04/30/2024    Personal history of gastric bypass 04/30/2024    Ventricular septal defect (VSD), membranous 03/06/2019    BMI 40.0-44.9, adult (HCC) 02/20/2019    Heart murmur 02/20/2019         Current Medications[1]  Allergies[2]    Review of Systems   Constitutional:  Negative for chills, fever, malaise/fatigue and weight loss.   HENT:  Positive for  "hearing loss and tinnitus. Negative for ear pain.    Respiratory:  Negative for cough and shortness of breath.    Cardiovascular:  Negative for chest pain.   Gastrointestinal:  Negative for abdominal pain, diarrhea, nausea and vomiting.   Skin:  Negative for rash.       Objective:     /76 (BP Location: Right arm, Patient Position: Sitting, BP Cuff Size: Adult)   Pulse 79   Temp 36.6 °C (97.9 °F) (Temporal)   Resp 16   Ht 1.6 m (5' 3\")   Wt 109 kg (241 lb 1.6 oz)   SpO2 94%   BMI 42.71 kg/m²   Body mass index is 42.71 kg/m².  Wt Readings from Last 4 Encounters:   07/15/25 109 kg (241 lb 1.6 oz)   01/24/25 107 kg (236 lb)   11/19/24 108 kg (237 lb)   09/22/24 107 kg (235 lb 12.8 oz)       Physical Exam  Vitals reviewed.   Constitutional:       General: She is not in acute distress.     Appearance: Normal appearance. She is morbidly obese. She is not ill-appearing.   HENT:      Head: Normocephalic and atraumatic.      Right Ear: There is impacted cerumen.      Left Ear: Tympanic membrane, ear canal and external ear normal.      Ears:      Comments: Left tympanic membrane partially blocked by cerumen.     Nose: Nose normal.      Mouth/Throat:      Mouth: Mucous membranes are moist.      Pharynx: No oropharyngeal exudate or posterior oropharyngeal erythema.   Eyes:      General: No scleral icterus.     Conjunctiva/sclera: Conjunctivae normal.   Cardiovascular:      Rate and Rhythm: Normal rate and regular rhythm.      Heart sounds: Murmur heard.   Pulmonary:      Effort: Pulmonary effort is normal. No respiratory distress.      Breath sounds: Normal breath sounds. No wheezing, rhonchi or rales.   Abdominal:      General: Abdomen is flat. Bowel sounds are normal. There is no distension.      Palpations: Abdomen is soft. There is no mass.      Tenderness: There is no abdominal tenderness. There is no guarding or rebound.   Musculoskeletal:      Cervical back: Normal range of motion and neck supple. No rigidity " or tenderness.      Right lower leg: No edema.      Left lower leg: No edema.   Lymphadenopathy:      Cervical: No cervical adenopathy.   Skin:     General: Skin is warm and dry.      Findings: No rash.   Neurological:      General: No focal deficit present.      Mental Status: She is alert and oriented to person, place, and time.      Gait: Gait normal.   Psychiatric:         Mood and Affect: Mood normal.         Behavior: Behavior normal.         Thought Content: Thought content normal.       Reviewed most recent/relevant labs/imaging.    Assessment and Plan:     1. Encounter for annual general medical examination without abnormal findings in adult  HCM: Reviewed with patient as above.  Immunizations discussed/recommended and patient directed to the pharmacy if vaccines not available in clinic.  Age-appropriate anticipatory guidance discussed: sun screen, dental visits, healthy diet/exercise, moderation of ETOH intake.    2. Macrocytosis without anemia  New finding, minimal elevation at 100.6.  No B12/folate deficiency, denies excess alcohol and is a non-smoker.  Will trend with annual labs or as needed.    3. Elevated vitamin B12 level  Chronic, worse from prior so can decrease B12 supplementation by half.    4. Elevated LDL cholesterol level  Chronic, not at threshold for statin. Discussed ideal ranges and ways to improve with lifestyle choices.    5. Nocturnal hypoxia  6. Severe obstructive sleep apnea  7. Pulmonary hypertension (HCC)  The patient's home sleep study revealed severe obstructive sleep apnea with low oxygen levels.  - She is advised to make an appointment with sleep medicine to discuss the titration study and potential treatment options.  - The patient prefers to wait until after summer due to a busy work schedule declines order for CPAP/BiPAP titration study.  Discussed that this may be contributing to pulmonary hypertension.  Followed by cardiology and asymptomatic, has repeat echocardiogram  scheduled for November this year.  Weight loss may be beneficial for sleep apnea.    8. Class 3 severe obesity with serious comorbidity and body mass index (BMI) of 40.0 to 44.9 in adult  Chronic obesity with worsening. Comorbidities that would benefit from weight loss.  Patient interested in weight loss medication in addition to diet and exercise. No contraindications, discussed titration and possible side effects.  If not covered by insurance will consider alternatives.  Also could consider self-pay with [employer].  - Tirzepatide-Weight Management 2.5 MG/0.5ML Solution Auto-injector; Inject 2.5 mg under the skin every 7 days for 28 days.  Dispense: 2 mL; Refill: 0    9. Impacted cerumen of right ear  10. Excessive cerumen in left ear canal  Right ear impaction resolved with irrigation/curette.  Left ear irrigated with some improvement but did not tolerate curette well so not completed, small amount of wax still present.  - She is advised to use Debrox over-the-counter ear drops to soften the wax and allow it to drain naturally during showers.    Follow-up: Return in 3 months (on 10/15/2025), or if symptoms worsen or fail to improve.           [1]   Current Outpatient Medications   Medication Sig Dispense Refill    Tirzepatide-Weight Management 2.5 MG/0.5ML Solution Auto-injector Inject 2.5 mg under the skin every 7 days for 28 days. 2 mL 0    multivitamin Tab Take 1 Tablet by mouth every day.      Cholecalciferol (VITAMIN D3 EXTRA STRENGTH) 25 MCG (1000 UT) Chew Tab Chew 2 Tablets every day.      cyanocobalamin (VITAMIN B12) 1000 MCG Tab Take 1 Tablet by mouth every day. 90 Tablet 3     No current facility-administered medications for this visit.   [2]   Allergies  Allergen Reactions    Cipro Xr Rash     Rash on hand

## 2025-07-18 NOTE — PROCEDURES
Procedure: Cerumen Removal  Risks and benefits of procedure discussed with patient.    Right ear was irrigated by Mary Jo Mendez MA.  Follow-up curette by provider showed presence of hard cerumen which was removed with curette.  Irrigation was repeated by PETE Aguilar with resolution of impaction.  Tolerated procedure well, TM normal.    Left ear irrigated by her Mary Jo Mendez with some improvement but on exam did have some residual cerumen which was attempted to remove with a curette by provider but patient did not tolerate well so procedure aborted.  Small amount of wax present, TM normal.    Patient tolerated the procedure well  Pt educated about proper care of ear canal. Q-tip cleaning discouraged, use of debrox and warm water lavage discussed.

## 2025-08-03 ENCOUNTER — PATIENT MESSAGE (OUTPATIENT)
Dept: MEDICAL GROUP | Facility: LAB | Age: 60
End: 2025-08-03
Payer: COMMERCIAL

## 2025-08-03 DIAGNOSIS — G47.33 SEVERE OBSTRUCTIVE SLEEP APNEA: ICD-10-CM

## 2025-08-03 DIAGNOSIS — E78.00 ELEVATED LDL CHOLESTEROL LEVEL: ICD-10-CM

## 2025-08-03 DIAGNOSIS — I27.20 PULMONARY HYPERTENSION (HCC): ICD-10-CM

## 2025-08-03 DIAGNOSIS — E66.813 CLASS 3 SEVERE OBESITY WITH SERIOUS COMORBIDITY AND BODY MASS INDEX (BMI) OF 40.0 TO 44.9 IN ADULT: ICD-10-CM

## 2025-08-06 RX ORDER — SEMAGLUTIDE 0.25 MG/.5ML
0.25 INJECTION, SOLUTION SUBCUTANEOUS
Qty: 2 ML | Refills: 1 | Status: SHIPPED | OUTPATIENT
Start: 2025-08-06

## 2025-08-14 ENCOUNTER — TELEPHONE (OUTPATIENT)
Dept: MEDICAL GROUP | Facility: LAB | Age: 60
End: 2025-08-14
Payer: COMMERCIAL

## 2025-08-19 ENCOUNTER — TELEPHONE (OUTPATIENT)
Dept: MEDICAL GROUP | Facility: LAB | Age: 60
End: 2025-08-19
Payer: COMMERCIAL

## 2025-08-20 ENCOUNTER — TELEPHONE (OUTPATIENT)
Dept: MEDICAL GROUP | Facility: LAB | Age: 60
End: 2025-08-20
Payer: COMMERCIAL